# Patient Record
Sex: MALE | Race: WHITE | Employment: OTHER | URBAN - METROPOLITAN AREA
[De-identification: names, ages, dates, MRNs, and addresses within clinical notes are randomized per-mention and may not be internally consistent; named-entity substitution may affect disease eponyms.]

---

## 2017-01-27 ENCOUNTER — OFFICE VISIT (OUTPATIENT)
Dept: NEUROLOGY | Age: 67
End: 2017-01-27

## 2017-01-27 VITALS
RESPIRATION RATE: 18 BRPM | WEIGHT: 221.4 LBS | BODY MASS INDEX: 31.7 KG/M2 | HEIGHT: 70 IN | HEART RATE: 98 BPM | TEMPERATURE: 98.3 F | DIASTOLIC BLOOD PRESSURE: 80 MMHG | SYSTOLIC BLOOD PRESSURE: 142 MMHG | OXYGEN SATURATION: 97 %

## 2017-01-27 DIAGNOSIS — G40.009 LOCALIZATION-RELATED (FOCAL) (PARTIAL) IDIOPATHIC EPILEPSY AND EPILEPTIC SYNDROMES WITH SEIZURES OF LOCALIZED ONSET, NOT INTRACTABLE, WITHOUT STATUS EPILEPTICUS (HCC): Primary | ICD-10-CM

## 2017-01-27 DIAGNOSIS — G35 MULTIPLE SCLEROSIS (HCC): ICD-10-CM

## 2017-01-27 NOTE — PATIENT INSTRUCTIONS
[facility]   Statement to Patients  April 1, 2014      In an effort to ensure the large volume of patient prescription refills is processed in the most efficient and expeditious manner, we are asking our patients to assist us by calling your Pharmacy for all prescription refills, this will include also your  Mail Order Pharmacy. The pharmacy will contact our office electronically to continue the refill process. Please do not wait until the last minute to call your pharmacy. We need at least 48 hours (2days) to fill prescriptions. We also encourage you to call your pharmacy before going to  your prescription to make sure it is ready. With regard to controlled substance prescription refill requests (narcotic refills) that need to be picked up at our office, we ask your cooperation by providing us with at least 72 hours (3days) notice that you will need a refill. We will not refill narcotic prescription refill requests after 4:00pm on any weekday, Monday through Thursday, or after 2:00pm on Fridays, or on the weekends. We encourage everyone to explore another way of getting your prescription refill request processed using Superhuman, our patient web portal through our electronic medical record system. Superhuman is an efficient and effective way to communicate your medication request directly to the office and  downloadable as an davis on your smart phone . Superhuman also features a review functionality that allows you to view your medication list as well as leave messages for your physician. Are you ready to get connected? If so please review the attatched instructions or speak to any of our staff to get you set up right away! Thank you so much for your cooperation. Should you have any questions please contact our Practice Administrator.     The Physicians and Staff,  [facility]  What is a living will?  A living will is a legal form you use to write down the kind of care you want at the end of your life. It is used by the health professionals who will treat you if you aren't able to decide for yourself. If you put your wishes in writing, your loved ones and others will know what kind of care you want. They won't need to guess. This can ease your mind and be helpful to others. A living will is not the same as an estate or property will. An estate will explains what you want to happen with your money and property after you die. Is a living will a legal document? A living will is a legal document. Each state has its own laws about living manuel. If you move to another state, make sure that your living will is legal in the state where you now live. Or you might use a universal form that has been approved by many states. This kind of form can sometimes be completed and stored online. Your electronic copy will then be available wherever you have a connection to the Internet. In most cases, doctors will respect your wishes even if you have a form from a different state. · You don't need an  to complete a living will. But legal advice can be helpful if your state's laws are unclear, your health history is complicated, or your family can't agree on what should be in your living will. · You can change your living will at any time. Some people find that their wishes about end-of-life care change as their health changes. · In addition to making a living will, think about completing a medical power of  form. This form lets you name the person you want to make end-of-life treatment decisions for you (your \"health care agent\") if you're not able to. Many hospitals and nursing homes will give you the forms you need to complete a living will and a medical power of . · Your living will is used only if you can't make or communicate decisions for yourself anymore.  If you become able to make decisions again, you can accept or refuse any treatment, no matter what you wrote in your living will. · Your state may offer an online registry. This is a place where you can store your living will online so the doctors and nurses who need to treat you can find it right away. What should you think about when creating a living will? Talk about your end-of-life wishes with your family members and your doctor. Let them know what you want. That way the people making decisions for you won't be surprised by your choices. Think about these questions as you make your living will:  · Do you know enough about life support methods that might be used? If not, talk to your doctor so you know what might be done if you can't breathe on your own, your heart stops, or you're unable to swallow. · What things would you still want to be able to do after you receive life-support methods? Would you want to be able to walk? To speak? To eat on your own? To live without the help of machines? · If you have a choice, where do you want to be cared for? In your home? At a hospital or nursing home? · Do you want certain Shinto practices performed if you become very ill? · If you have a choice at the end of your life, where would you prefer to die? At home? In a hospital or nursing home? Somewhere else? · Would you prefer to be buried or cremated? · Do you want your organs to be donated after you die? What should you do with your living will? · Make sure that your family members and your health care agent have copies of your living will. · Give your doctor a copy of your living will to keep in your medical record. If you have more than one doctor, make sure that each one has a copy. · You may want to put a copy of your living will where it can be easily found. Where can you learn more? Go to http://liss-wilder.info/. Enter V187 in the search box to learn more about \"Learning About Living Michelle. \"  Current as of: February 24, 2016  Content Version: 11.1  © 1384-8647 [employer]. Care instructions adapted under license by [employer] (which disclaims liability or warranty for this information). If you have questions about a medical condition or this instruction, always ask your healthcare professional. [address] any warranty or liability for your use of this information. Patient doing reasonably well with no seizure recurrence and will maintain Keppra as before. No strong issues or changes with the primary progressive MS and will recommend that he stay as reasonably and safely busy as possible. Revisit 6 months.

## 2017-01-27 NOTE — PROGRESS NOTES
Neurology Consult      Subjective:      Ervin Peterson is a 73 y.o. male who returns with established history of seizures and primary progressive MS. No seizure breakouts and has excellent compliance with his Keppra extended release 500 mg 4 per day. Went over the education process to the elimination of Keppra through the kidneys and how they are linked and what might occur if there is significant kidney dysfunction. Seem to appreciate the information and understood. Primary progressive MS. On no treatment as there is no sanctioned medicine for the same at least not yet. Is very astute and on a sharp learning curve to his limitations and possibilities with left leg function and is very motivated to improve his situation and stay out of trouble. Exam today showed his baseline deficits with left leg function and I did not see a great deal of change. Mentions no new medical or surgical history. Revisit in about 6 months. Current Outpatient Prescriptions   Medication Sig Dispense Refill    LEVETIRACETAM (KEPPRA XR PO) Take 500 mg by mouth.  pravastatin (PRAVACHOL) 20 mg tablet Take 20 mg by mouth nightly.  lisinopril (PRINIVIL, ZESTRIL) 5 mg tablet Take  by mouth daily. No Known Allergies  Past Medical History   Diagnosis Date    Autoimmune disease ([address]      MS    Hypertension     Ill-defined condition      incr. cholesterol    MS (multiple sclerosis) (HCC)      Dx in 2013    Seizures ([facility]      last was 2/14/2012 (none since back on seizure med)      Past Surgical History   Procedure Laterality Date    Hx other surgical       wisdom teeth    Hx tonsillectomy        Social History     Social History    Marital status:      Spouse name: N/A    Number of children: N/A    Years of education: N/A     Occupational History    Not on file.      Social History Main Topics    Smoking status: Never Smoker    Smokeless tobacco: Not on file    Alcohol use No    Drug use: No    Sexual activity: Not on file     Other Topics Concern    Not on file     Social History Narrative      History reviewed. No pertinent family history. Visit Vitals    /80    Pulse 98    Temp 98.3 °F (36.8 °C) (Oral)    Resp 18    Ht 5' 10\" (1.778 m)    Wt 100.4 kg (221 lb 6.4 oz)    SpO2 97%    BMI 31.77 kg/m2        Review of Systems:   A comprehensive review of systems was negative except for that written in the HPI. Neuro Exam:     Appearance: The patient is well developed, well nourished, provides a coherent history and is in no acute distress. Mental Status: Oriented to time, place and person. Mood and affect appropriate. Cranial Nerves:   Intact visual fields. Fundi are benign. BALDO, EOM's full, no nystagmus, no ptosis. Facial sensation is normal. Corneal reflexes are intact. Facial movement is symmetric. Hearing is normal bilaterally. Palate is midline with normal sternocleidomastoid and trapezius muscles are normal. Tongue is midline. Motor:  5/5 strength in upper and lower proximal and distal muscles except left leg where strength is 4+-5-. Normal bulk and tone. No fasciculations. Reflexes:   Deep tendon reflexes 2+/4 and symmetrical except approaching +3 below the waist .   Sensory:   Diminished distally to touch, pinprick and vibration. Gait:   circumduct's and limps on left leg step to step. Tremor:   No tremor noted. Cerebellar:  No cerebellar signs present. Neurovascular:  Normal heart sounds and regular rhythm, peripheral pulses intact, and no carotid bruits. 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 Continue Keppra extended release 500 mg 4 per day. Went over the education process to the effect of kidney disease on the elimination of Keppra etc.    Problem 2 primary progressive MS.   Doing reasonably well and is on a very strong learning curve with his limitations and possibilities. Stay as reasonably and safely busy as possible. No big changes seen on exam today. Plan:   Revisit 6 months.   Signed by :  Librado Francisco MD

## 2017-01-27 NOTE — MR AVS SNAPSHOT
Visit Information Date & Time Provider Department Dept. Phone Encounter #  
 1/27/2017 10:40 AM Doc MD [facility] [phone] [id number] Follow-up Instructions Return in about 6 months (around 7/27/2017). Upcoming Health Maintenance Date Due Hepatitis C Screening 1950 DTaP/Tdap/Td series (1 - Tdap) 8/28/1972 FOBT Q 1 YEAR AGE 50-75 8/28/2001 ZOSTER VACCINE AGE 60> 8/28/2011 INFLUENZA AGE 9 TO ADULT 8/1/2016 GLAUCOMA SCREENING Q2Y 8/28/2016 Pneumococcal 65+ Low/Medium Risk (1 of 2 - PCV13) 8/28/2016 MEDICARE YEARLY EXAM 8/28/2016 Allergies as of 1/27/2017  Review Complete On: 1/27/2017 By: Sebastián Ramirez LPN No Known Allergies Current Immunizations  Never Reviewed No immunizations on file. Not reviewed this visit You Were Diagnosed With   
  
 Codes Comments Localization-related (focal) (partial) idiopathic epilepsy and epileptic syndromes with seizures of localized onset, not intractable, without status epilepticus ([facility]    -  Primary ICD-10-CM: G40.009 ICD-9-CM: 345.50 Multiple sclerosis ([facility]     ICD-10-CM: G35 
ICD-9-CM: 705 Vitals BP Pulse Temp Resp Height(growth percentile) Weight(growth percentile) 142/80 98 98.3 °F (36.8 °C) (Oral) 18 5' 10\" (1.778 m) 221 lb 6.4 oz (100.4 kg) SpO2 BMI Smoking Status 97% 31.77 kg/m2 Never Smoker Vitals History BMI and BSA Data Body Mass Index Body Surface Area 31.77 kg/m 2 2.23 m 2 Preferred Pharmacy Pharmacy Name Phone [address] [facility] [phone] Your Updated Medication List  
  
   
This list is accurate as of: 1/27/17 11:25 AM.  Always use your most recent med list.  
  
  
  
  
 KEPPRA XR PO Take 500 mg by mouth.  
  
 lisinopril 5 mg tablet Commonly known as:  Antoine Ney Take  by mouth daily. pravastatin 20 mg tablet Commonly known as:  PRAVACHOL Take 20 mg by mouth nightly. Follow-up Instructions Return in about 6 months (around 7/27/2017). Patient Instructions PRESCRIPTION REFILL POLICY [facility] Statement to Patients April 1, 2014 In an effort to ensure the large volume of patient prescription refills is processed in the most efficient and expeditious manner, we are asking our patients to assist us by calling your Pharmacy for all prescription refills, this will include also your  Mail Order Pharmacy. The pharmacy will contact our office electronically to continue the refill process. Please do not wait until the last minute to call your pharmacy. We need at least 48 hours (2days) to fill prescriptions. We also encourage you to call your pharmacy before going to  your prescription to make sure it is ready. With regard to controlled substance prescription refill requests (narcotic refills) that need to be picked up at our office, we ask your cooperation by providing us with at least 72 hours (3days) notice that you will need a refill. We will not refill narcotic prescription refill requests after 4:00pm on any weekday, Monday through Thursday, or after 2:00pm on Fridays, or on the weekends. We encourage everyone to explore another way of getting your prescription refill request processed using ioSafe, our patient web portal through our electronic medical record system. ioSafe is an efficient and effective way to communicate your medication request directly to the office and  downloadable as an davis on your smart phone . ioSafe also features a review functionality that allows you to view your medication list as well as leave messages for your physician. Are you ready to get connected? If so please review the attatched instructions or speak to any of our staff to get you set up right away! Thank you so much for your cooperation. Should you have any questions please contact our Practice Administrator. The Physicians and Staff,  [facility] [address] What is a living will? A living will is a legal form you use to write down the kind of care you want at the end of your life. It is used by the health professionals who will treat you if you aren't able to decide for yourself. If you put your wishes in writing, your loved ones and others will know what kind of care you want. They won't need to guess. This can ease your mind and be helpful to others. A living will is not the same as an estate or property will. An estate will explains what you want to happen with your money and property after you die. Is a living will a legal document? A living will is a legal document. Each state has its own laws about living manuel. If you move to another state, make sure that your living will is legal in the state where you now live. Or you might use a universal form that has been approved by many states. This kind of form can sometimes be completed and stored online. Your electronic copy will then be available wherever you have a connection to the Internet. In most cases, doctors will respect your wishes even if you have a form from a different state. · You don't need an  to complete a living will. But legal advice can be helpful if your state's laws are unclear, your health history is complicated, or your family can't agree on what should be in your living will. · You can change your living will at any time. Some people find that their wishes about end-of-life care change as their health changes. · In addition to making a living will, think about completing a medical power of  form.  This form lets you name the person you want to make end-of-life treatment decisions for you (your \"health care agent\") if you're not able to. Many hospitals and nursing homes will give you the forms you need to complete a living will and a medical power of . · Your living will is used only if you can't make or communicate decisions for yourself anymore. If you become able to make decisions again, you can accept or refuse any treatment, no matter what you wrote in your living will. · Your state may offer an online registry. This is a place where you can store your living will online so the doctors and nurses who need to treat you can find it right away. What should you think about when creating a living will? Talk about your end-of-life wishes with your family members and your doctor. Let them know what you want. That way the people making decisions for you won't be surprised by your choices. Think about these questions as you make your living will: · Do you know enough about life support methods that might be used? If not, talk to your doctor so you know what might be done if you can't breathe on your own, your heart stops, or you're unable to swallow. · What things would you still want to be able to do after you receive life-support methods? Would you want to be able to walk? To speak? To eat on your own? To live without the help of machines? · If you have a choice, where do you want to be cared for? In your home? At a hospital or nursing home? · Do you want certain Gnosticist practices performed if you become very ill? · If you have a choice at the end of your life, where would you prefer to die? At home? In a hospital or nursing home? Somewhere else? · Would you prefer to be buried or cremated? · Do you want your organs to be donated after you die? What should you do with your living will? · Make sure that your family members and your health care agent have copies of your living will.  
· Give your doctor a copy of your living will to keep in your medical record. If you have more than one doctor, make sure that each one has a copy. · You may want to put a copy of your living will where it can be easily found. Where can you learn more? Go to http://liss-wilder.info/. Enter S100 in the search box to learn more about \"Learning About Living Giovanni Nava. \" Current as of: February 24, 2016 Content Version: 11.1 © 3699-3181 [employer]. Care instructions adapted under license by [employer] (which disclaims liability or warranty for this information). If you have questions about a medical condition or this instruction, always ask your healthcare professional. [address] any warranty or liability for your use of this information. Patient doing reasonably well with no seizure recurrence and will maintain Keppra as before. No strong issues or changes with the primary progressive MS and will recommend that he stay as reasonably and safely busy as possible. Revisit 6 months. Introducing [facility]! Bianca Ness introduces [employer] patient portal. Now you can access parts of your medical record, email your doctor's office, and request medication refills online. 1. In your internet browser, go to [email or web address] 2. Click on the First Time User? Click Here link in the Sign In box. You will see the New Member Sign Up page. 3. Enter your [employer] Access Code exactly as it appears below. You will not need to use this code after youve completed the sign-up process. If you do not sign up before the expiration date, you must request a new code. · [employer] Access Code: [email or web address] Expires: 4/27/2017 10:23 AM 
 
4. Enter the last four digits of your Social Security Number (xxxx) and Date of Birth (mm/dd/yyyy) as indicated and click Submit. You will be taken to the next sign-up page. 5. Create a [employer] ID.  This will be your [employer] login ID and cannot be changed, so think of one that is secure and easy to remember. 6. Create a SnapShop password. You can change your password at any time. 7. Enter your Password Reset Question and Answer. This can be used at a later time if you forget your password. 8. Enter your e-mail address. You will receive e-mail notification when new information is available in [address] 9. Click Sign Up. You can now view and download portions of your medical record. 10. Click the Download Summary menu link to download a portable copy of your medical information. If you have questions, please visit the Frequently Asked Questions section of the SnapShop website. Remember, SnapShop is NOT to be used for urgent needs. For medical emergencies, dial 911. Now available from your iPhone and Android! Please provide this summary of care documentation to your next provider. Your primary care clinician is listed as Mercedes Chaudhry. If you have any questions after today's visit, please call [phone].

## 2017-07-28 ENCOUNTER — OFFICE VISIT (OUTPATIENT)
Dept: NEUROLOGY | Age: 67
End: 2017-07-28

## 2017-07-28 VITALS
BODY MASS INDEX: 31.65 KG/M2 | OXYGEN SATURATION: 96 % | HEART RATE: 99 BPM | DIASTOLIC BLOOD PRESSURE: 82 MMHG | WEIGHT: 221.1 LBS | RESPIRATION RATE: 18 BRPM | TEMPERATURE: 98.9 F | SYSTOLIC BLOOD PRESSURE: 124 MMHG | HEIGHT: 70 IN

## 2017-07-28 DIAGNOSIS — G35 MULTIPLE SCLEROSIS (HCC): Primary | ICD-10-CM

## 2017-07-28 DIAGNOSIS — G40.209 LOCALIZATION-RELATED PARTIAL EPILEPSY WITH COMPLEX PARTIAL SEIZURES (HCC): ICD-10-CM

## 2017-07-28 NOTE — MR AVS SNAPSHOT
Visit Information Date & Time Provider Department Dept. Phone Encounter #  
 7/28/2017 11:40 AM [facility] [phone] [id number] Follow-up Instructions Return in about 6 months (around 1/28/2018). Your Appointments 2/2/2018 11:40 AM  
Follow Up with Eva Abraham MD  
[facility]) Appt Note: follow  up seizures/MS  $0CP  [place]  7/28/17  
 [place] [address] [facility] [address] [id number] [phone]  
  
   
 [place] [id number] [address] Upcoming Health Maintenance Date Due Hepatitis C Screening 1950 DTaP/Tdap/Td series (1 - Tdap) 8/28/1972 FOBT Q 1 YEAR AGE 50-75 8/28/2001 ZOSTER VACCINE AGE 60> 6/28/2011 GLAUCOMA SCREENING Q2Y 8/28/2016 Pneumococcal 65+ Low/Medium Risk (1 of 2 - PCV13) 8/28/2016 MEDICARE YEARLY EXAM 8/28/2016 INFLUENZA AGE 9 TO ADULT 8/1/2017 Allergies as of 7/28/2017  Review Complete On: 7/28/2017 By: Eva Abraham MD  
 No Known Allergies Current Immunizations  Never Reviewed No immunizations on file. Not reviewed this visit You Were Diagnosed With   
  
 Codes Comments Multiple sclerosis ([facility] [address]    -  Primary ICD-10-CM: G35 
ICD-9-CM: 444 Localization-related partial epilepsy with complex partial seizures ([facility] [address]     ICD-10-CM: J85.873 ICD-9-CM: 345.40 Vitals BP Pulse Temp Resp Height(growth percentile) Weight(growth percentile) 124/82 99 98.9 °F (37.2 °C) (Oral) 18 5' 10\" (1.778 m) 221 lb 1.6 oz (100.3 kg) SpO2 BMI Smoking Status 96% 31.72 kg/m2 Never Smoker Vitals History BMI and BSA Data Body Mass Index Body Surface Area 31.72 kg/m 2 2.23 m 2 Preferred Pharmacy Pharmacy Name Phone [address] [facility] [phone] Your Updated Medication List  
  
   
This list is accurate as of: 7/28/17 12:20 PM.  Always use your most recent med list.  
  
  
  
  
 KEPPRA XR PO Take 500 mg by mouth.  
  
 lisinopril 5 mg tablet Commonly known as:  Grainger Emeli Take  by mouth daily. pravastatin 20 mg tablet Commonly known as:  PRAVACHOL Take 20 mg by mouth nightly. Follow-up Instructions Return in about 6 months (around 1/28/2018). Patient Instructions PRESCRIPTION REFILL POLICY [facility] Statement to Patients April 1, 2014 In an effort to ensure the large volume of patient prescription refills is processed in the most efficient and expeditious manner, we are asking our patients to assist us by calling your Pharmacy for all prescription refills, this will include also your  Mail Order Pharmacy. The pharmacy will contact our office electronically to continue the refill process. Please do not wait until the last minute to call your pharmacy. We need at least 48 hours (2days) to fill prescriptions. We also encourage you to call your pharmacy before going to  your prescription to make sure it is ready. With regard to controlled substance prescription refill requests (narcotic refills) that need to be picked up at our office, we ask your cooperation by providing us with at least 72 hours (3days) notice that you will need a refill. We will not refill narcotic prescription refill requests after 4:00pm on any weekday, Monday through Thursday, or after 2:00pm on Fridays, or on the weekends. We encourage everyone to explore another way of getting your prescription refill request processed using [employer], our patient web portal through our electronic medical record system. [employer] is an efficient and effective way to communicate your medication request directly to the office and  downloadable as an davis on your smart phone .  [employer] also features a review functionality that allows you to view your medication list as well as leave messages for your physician. Are you ready to get connected? If so please review the attatched instructions or speak to any of our staff to get you set up right away! Thank you so much for your cooperation. Should you have any questions please contact our Practice Administrator. The Physicians and Staff,  [facility] Multiple Sclerosis (MS): Care Instructions Your Care Instructions Multiple sclerosis, also called MS, is a disease that can affect the brain, spinal cord, and nerves to the eyes. MS can cause problems with muscle control and strength, vision, balance, feeling, and thinking. Whatever your symptoms are, taking medicine correctly and following your doctor's advice for home care can help you maintain your quality of life. Follow-up care is a key part of your treatment and safety. Be sure to make and go to all appointments, and call your doctor if you are having problems. It's also a good idea to know your test results and keep a list of the medicines you take. How can you care for yourself at home? General care · Take your medicines exactly as prescribed. Call your doctor if you think you are having a problem with your medicine. · Use a cane, walker, or scooter if your doctor suggests it. · Keep doing your normal activities as much as you can. · If you have problems urinating, press or tap your bladder area to help start urine flow. If you have trouble controlling your urine, plan your fluid intake and activities so that a toilet will be available when you need it. · Spend time with family and friends. Join a support group for people with MS if you want extra help. · Depression is common with this condition. Tell your doctor if you have trouble sleeping, are eating too much or are not hungry, or feel sad or tearful all the time.  Depression can be treated with medicine and counseling. Diet and exercise · Eat a balanced diet. · If you have problems swallowing, change how and what you eat: ¨ Try thick drinks, such as milk shakes. They are easier to swallow than other fluids. ¨ Do not eat foods that crumble easily. These can cause choking. ¨ Use a  to prepare food. Soft foods need less chewing. ¨ Eat small meals often so that you do not get tired from eating larger meals. · Get exercise on most days. Work with your doctor to set up a program of walking, swimming, or other exercise that you are able to do. A physical therapist can teach you exercises if you cannot walk but can move your limbs and trunk. Or you can do exercises to help with coordination and balance. You can help improve muscle stiffness by doing exercises while lying in certain positions. When should you call for help? Call your doctor now or seek immediate medical care if: 
· You have a change in symptoms. · You fall or have another injury. · You have symptoms of a urinary infection. For example: ¨ You have blood or pus in your urine. ¨ You have pain in your back just below your rib cage. This is called flank pain. ¨ You have a fever, chills, or body aches. ¨ It hurts to urinate. ¨ You have groin or belly pain. Watch closely for changes in your health, and be sure to contact your doctor if: 
· You want more information about MS or medicines. · You have questions about alternative treatments. Do not use any other treatments without talking to your doctor first. 
Where can you learn more? Go to http://liss-wilder.info/. Enter I907 in the search box to learn more about \"Multiple Sclerosis (MS): Care Instructions. \" Current as of: November 28, 2016 Content Version: 11.3 © 2967-5302 Personal Estate Manager.  Care instructions adapted under license by [employer] (which disclaims liability or warranty for this information). If you have questions about a medical condition or this instruction, always ask your healthcare professional. [place] [address] any warranty or liability for your use of this information. Randy Londono 1721 What is a living will? A living will is a legal form you use to write down the kind of care you want at the end of your life. It is used by the health professionals who will treat you if you aren't able to decide for yourself. If you put your wishes in writing, your loved ones and others will know what kind of care you want. They won't need to guess. This can ease your mind and be helpful to others. A living will is not the same as an estate or property will. An estate will explains what you want to happen with your money and property after you die. Is a living will a legal document? A living will is a legal document. Each state has its own laws about living manuel. If you move to another state, make sure that your living will is legal in the state where you now live. Or you might use a universal form that has been approved by many states. This kind of form can sometimes be completed and stored online. Your electronic copy will then be available wherever you have a connection to the Internet. In most cases, doctors will respect your wishes even if you have a form from a different state. · You don't need an  to complete a living will. But legal advice can be helpful if your state's laws are unclear, your health history is complicated, or your family can't agree on what should be in your living will. · You can change your living will at any time. Some people find that their wishes about end-of-life care change as their health changes. · In addition to making a living will, think about completing a medical power of  form.  This form lets you name the person you want to make end-of-life treatment decisions for you (your \"health care agent\") if you're not able to. Many hospitals and nursing homes will give you the forms you need to complete a living will and a medical power of . · Your living will is used only if you can't make or communicate decisions for yourself anymore. If you become able to make decisions again, you can accept or refuse any treatment, no matter what you wrote in your living will. · Your state may offer an online registry. This is a place where you can store your living will online so the doctors and nurses who need to treat you can find it right away. What should you think about when creating a living will? Talk about your end-of-life wishes with your family members and your doctor. Let them know what you want. That way the people making decisions for you won't be surprised by your choices. Think about these questions as you make your living will: · Do you know enough about life support methods that might be used? If not, talk to your doctor so you know what might be done if you can't breathe on your own, your heart stops, or you're unable to swallow. · What things would you still want to be able to do after you receive life-support methods? Would you want to be able to walk? To speak? To eat on your own? To live without the help of machines? · If you have a choice, where do you want to be cared for? In your home? At a hospital or nursing home? · Do you want certain Mormonism practices performed if you become very ill? · If you have a choice at the end of your life, where would you prefer to die? At home? In a hospital or nursing home? Somewhere else? · Would you prefer to be buried or cremated? · Do you want your organs to be donated after you die? What should you do with your living will? · Make sure that your family members and your health care agent have copies of your living will. · Give your doctor a copy of your living will to keep in your medical record. If you have more than one doctor, make sure that each one has a copy. · You may want to put a copy of your living will where it can be easily found. Where can you learn more? Go to http://liss-wilder.info/. Enter X809 in the search box to learn more about \"Learning About Living [employer]. \" Current as of: August 8, 2016 Content Version: 11.3 © 9966-0210 [employer]. Care instructions adapted under license by [employer] (which disclaims liability or warranty for this information). If you have questions about a medical condition or this instruction, always ask your healthcare professional. [address] any warranty or liability for your use of this information. Patient appears baseline and will recommend simple continuation of Keppra and the primary progressive MS looks status quo as well. Revisit 6 months. Introducing [facility]! Tunde Molina introduces [employer] patient portal. Now you can access parts of your medical record, email your doctor's office, and request medication refills online. 1. In your internet browser, go to [email or web address] 2. Click on the First Time User? Click Here link in the Sign In box. You will see the New Member Sign Up page. 3. Enter your [employer] Access Code exactly as it appears below. You will not need to use this code after youve completed the sign-up process. If you do not sign up before the expiration date, you must request a new code. · [employer] Access Code: [email or web address] Expires: 10/26/2017 11:34 AM 
 
4. Enter the last four digits of your Social Security Number (xxxx) and Date of Birth (mm/dd/yyyy) as indicated and click Submit. You will be taken to the next sign-up page. 5. Create a [employer] Local ID.  This will be your [employer] login ID and cannot be changed, so think of one that is secure and easy to remember. 6. Create a DiscountDoc password. You can change your password at any time. 7. Enter your Password Reset Question and Answer. This can be used at a later time if you forget your password. 8. Enter your e-mail address. You will receive e-mail notification when new information is available in [address] 9. Click Sign Up. You can now view and download portions of your medical record. 10. Click the Download Summary menu link to download a portable copy of your medical information. If you have questions, please visit the Frequently Asked Questions section of the Disc[employer] website. Remember, [employer]Doc is NOT to be used for urgent needs. For medical emergencies, dial 911. Now available from your iPhone and Android! Please provide this summary of care documentation to your next provider. Your primary care clinician is listed as Amy Solis. If you have any questions after today's visit, please call [phone].

## 2017-07-28 NOTE — PATIENT INSTRUCTIONS
10 [facility]   Statement to Patients  April 1, 2014      In an effort to ensure the large volume of patient prescription refills is processed in the most efficient and expeditious manner, we are asking our patients to assist us by calling your Pharmacy for all prescription refills, this will include also your  Mail Order Pharmacy. The pharmacy will contact our office electronically to continue the refill process. Please do not wait until the last minute to call your pharmacy. We need at least 48 hours (2days) to fill prescriptions. We also encourage you to call your pharmacy before going to  your prescription to make sure it is ready. With regard to controlled substance prescription refill requests (narcotic refills) that need to be picked up at our office, we ask your cooperation by providing us with at least 72 hours (3days) notice that you will need a refill. We will not refill narcotic prescription refill requests after 4:00pm on any weekday, Monday through Thursday, or after 2:00pm on Fridays, or on the weekends. We encourage everyone to explore another way of getting your prescription refill request processed using [employer], our patient web portal through our electronic medical record system. [employer] is an efficient and effective way to communicate your medication request directly to the office and  downloadable as an davis on your smart phone . [employer] also features a review functionality that allows you to view your medication list as well as leave messages for your physician. Are you ready to get connected? If so please review the attatched instructions or speak to any of our staff to get you set up right away! Thank you so much for your cooperation. Should you have any questions please contact our Practice Administrator.     The Physicians and Staff,  [facility]                Multiple Sclerosis (MS): Care Instructions  Your Care Instructions  Multiple sclerosis, also called MS, is a disease that can affect the brain, spinal cord, and nerves to the eyes. MS can cause problems with muscle control and strength, vision, balance, feeling, and thinking. Whatever your symptoms are, taking medicine correctly and following your doctor's advice for home care can help you maintain your quality of life. Follow-up care is a key part of your treatment and safety. Be sure to make and go to all appointments, and call your doctor if you are having problems. It's also a good idea to know your test results and keep a list of the medicines you take. How can you care for yourself at home? General care  · Take your medicines exactly as prescribed. Call your doctor if you think you are having a problem with your medicine. · Use a cane, walker, or scooter if your doctor suggests it. · Keep doing your normal activities as much as you can. · If you have problems urinating, press or tap your bladder area to help start urine flow. If you have trouble controlling your urine, plan your fluid intake and activities so that a toilet will be available when you need it. · Spend time with family and friends. Join a support group for people with MS if you want extra help. · Depression is common with this condition. Tell your doctor if you have trouble sleeping, are eating too much or are not hungry, or feel sad or tearful all the time. Depression can be treated with medicine and counseling. Diet and exercise  · Eat a balanced diet. · If you have problems swallowing, change how and what you eat:  ¨ Try thick drinks, such as milk shakes. They are easier to swallow than other fluids. ¨ Do not eat foods that crumble easily. These can cause choking. ¨ Use a  to prepare food. Soft foods need less chewing. ¨ Eat small meals often so that you do not get tired from eating larger meals. · Get exercise on most days.  Work with your doctor to set up a program of walking, swimming, or other exercise that you are able to do. A physical therapist can teach you exercises if you cannot walk but can move your limbs and trunk. Or you can do exercises to help with coordination and balance. You can help improve muscle stiffness by doing exercises while lying in certain positions. When should you call for help? Call your doctor now or seek immediate medical care if:  · You have a change in symptoms. · You fall or have another injury. · You have symptoms of a urinary infection. For example:  ¨ You have blood or pus in your urine. ¨ You have pain in your back just below your rib cage. This is called flank pain. ¨ You have a fever, chills, or body aches. ¨ It hurts to urinate. ¨ You have groin or belly pain. Watch closely for changes in your health, and be sure to contact your doctor if:  · You want more information about MS or medicines. · You have questions about alternative treatments. Do not use any other treatments without talking to your doctor first.  Where can you learn more? Go to http://liss-wilder.info/. Enter G502 in the search box to learn more about \"Multiple Sclerosis (MS): Care Instructions. \"  Current as of: November 28, 2016  Content Version: 11.3  © 7298-8243 [employer]. Care instructions adapted under license by [employer] (which disclaims liability or warranty for this information). If you have questions about a medical condition or this instruction, always ask your healthcare professional. [place] [address] any warranty or liability for your use of this information. Learning About Living Perroy  What is a living will? A living will is a legal form you use to write down the kind of care you want at the end of your life. It is used by the health professionals who will treat you if you aren't able to decide for yourself.   If you put your wishes in writing, your loved ones and others will know what kind of care you want. They won't need to guess. This can ease your mind and be helpful to others. A living will is not the same as an estate or property will. An estate will explains what you want to happen with your money and property after you die. Is a living will a legal document? A living will is a legal document. Each state has its own laws about living manuel. If you move to another state, make sure that your living will is legal in the state where you now live. Or you might use a universal form that has been approved by many states. This kind of form can sometimes be completed and stored online. Your electronic copy will then be available wherever you have a connection to the Internet. In most cases, doctors will respect your wishes even if you have a form from a different state. · You don't need an  to complete a living will. But legal advice can be helpful if your state's laws are unclear, your health history is complicated, or your family can't agree on what should be in your living will. · You can change your living will at any time. Some people find that their wishes about end-of-life care change as their health changes. · In addition to making a living will, think about completing a medical power of  form. This form lets you name the person you want to make end-of-life treatment decisions for you (your \"health care agent\") if you're not able to. Many hospitals and nursing homes will give you the forms you need to complete a living will and a medical power of . · Your living will is used only if you can't make or communicate decisions for yourself anymore. If you become able to make decisions again, you can accept or refuse any treatment, no matter what you wrote in your living will. · Your state may offer an online registry.  This is a place where you can store your living will online so the doctors and nurses who need to treat you can find it right away. What should you think about when creating a living will? Talk about your end-of-life wishes with your family members and your doctor. Let them know what you want. That way the people making decisions for you won't be surprised by your choices. Think about these questions as you make your living will:  · Do you know enough about life support methods that might be used? If not, talk to your doctor so you know what might be done if you can't breathe on your own, your heart stops, or you're unable to swallow. · What things would you still want to be able to do after you receive life-support methods? Would you want to be able to walk? To speak? To eat on your own? To live without the help of machines? · If you have a choice, where do you want to be cared for? In your home? At a hospital or nursing home? · Do you want certain Zoroastrianism practices performed if you become very ill? · If you have a choice at the end of your life, where would you prefer to die? At home? In a hospital or nursing home? Somewhere else? · Would you prefer to be buried or cremated? · Do you want your organs to be donated after you die? What should you do with your living will? · Make sure that your family members and your health care agent have copies of your living will. · Give your doctor a copy of your living will to keep in your medical record. If you have more than one doctor, make sure that each one has a copy. · You may want to put a copy of your living will where it can be easily found. Where can you learn more? Go to http://liss-wilder.info/. Enter W324 in the search box to learn more about \"Learning About Living Michelle. \"  Current as of: August 8, 2016  Content Version: 11.3  © 4094-4773 [employer]. Care instructions adapted under license by [employer] (which disclaims liability or warranty for this information).  If you have questions about a medical condition or this instruction, always ask your healthcare professional. [place] [address] any warranty or liability for your use of this information. Patient appears baseline and will recommend simple continuation of Keppra and the primary progressive MS looks status quo as well. Revisit 6 months.

## 2017-09-18 RX ORDER — LEVETIRACETAM 500 MG/1
TABLET, EXTENDED RELEASE ORAL
Qty: 360 TAB | Refills: 3 | Status: SHIPPED | OUTPATIENT
Start: 2017-09-18 | End: 2018-11-15 | Stop reason: SDUPTHER

## 2018-02-02 ENCOUNTER — OFFICE VISIT (OUTPATIENT)
Dept: NEUROLOGY | Age: 68
End: 2018-02-02

## 2018-02-02 VITALS
OXYGEN SATURATION: 97 % | BODY MASS INDEX: 31.7 KG/M2 | WEIGHT: 221.4 LBS | HEIGHT: 70 IN | TEMPERATURE: 98.7 F | HEART RATE: 77 BPM | RESPIRATION RATE: 18 BRPM

## 2018-02-02 DIAGNOSIS — G35 MS (MULTIPLE SCLEROSIS) (HCC): Primary | ICD-10-CM

## 2018-02-02 DIAGNOSIS — G40.209 LOCALIZATION-RELATED SYMPTOMATIC EPILEPSY AND EPILEPTIC SYNDROMES WITH COMPLEX PARTIAL SEIZURES, NOT INTRACTABLE, WITHOUT STATUS EPILEPTICUS (HCC): ICD-10-CM

## 2018-02-02 NOTE — MR AVS SNAPSHOT
[address] [place], [place] [address] [id number] [phone] Patient: Mariana Jones MRN: [id number] Visit Information Date & Time Provider Department Dept. Phone Encounter #  
 2/2/2018 11:40 AM Albaro Brush MD [facility] [phone] [id number] Follow-up Instructions Return in about 6 months (around 8/2/2018). Your Appointments 8/3/2018 11:40 AM  
Follow Up with Albaro Brush MD  
[facility]) Appt Note: follow up seizures/MS  $0CP  [place]  [facility]18 [facility] [address] [facility] [id number] [phone]  
  
   
 [facility] [address] [id number] [address] Upcoming Health Maintenance Date Due Hepatitis C Screening 1950 DTaP/Tdap/Td series (1 - Tdap) 8/28/1972 FOBT Q 1 YEAR AGE 50-75 8/28/2001 ZOSTER VACCINE AGE 60> 6/28/2011 GLAUCOMA SCREENING Q2Y 8/28/2016 Pneumococcal 65+ Low/Medium Risk (1 of 2 - PCV13) 8/28/2016 MEDICARE YEARLY EXAM 8/28/2016 Influenza Age 5 to Adult 8/1/2017 Allergies as of 2/2/2018  Review Complete On: 2/2/2018 By: Albaro Brush MD  
 No Known Allergies Current Immunizations  Never Reviewed No immunizations on file. Not reviewed this visit You Were Diagnosed With   
  
 Codes Comments MS (multiple sclerosis) ([facility] [address]    -  Primary ICD-10-CM: G35 
ICD-9-CM: 004 Localization-related symptomatic epilepsy and epileptic syndromes with complex partial seizures, not intractable, without status epilepticus ([facility] [address]     ICD-10-CM: Z17.547 ICD-9-CM: 345.40 Vitals Pulse Temp Resp Height(growth percentile) Weight(growth percentile) SpO2  
 77 98.7 °F (37.1 °C) (Oral) 18 5' 10\" (1.778 m) 221 lb 6.4 oz (100.4 kg) 97% BMI Smoking Status 31.77 kg/m2 Never Smoker Vitals History BMI and BSA Data Body Mass Index Body Surface Area 31.77 kg/m 2 2.23 m 2 Preferred Pharmacy Pharmacy Name Phone Greg Campos [address] [place], [place] [id number] [address] [phone] Your Updated Medication List  
  
   
This list is accurate as of: 2/2/18 12:23 PM.  Always use your most recent med list.  
  
  
  
  
 levETIRAcetam 500 mg ER tablet Commonly known as:  KEPPRA XR  
TAKE 4 TABLETS EVERY MORNING  
  
 lisinopril 5 mg tablet Commonly known as:  Cleotis Limon Take  by mouth daily. pravastatin 20 mg tablet Commonly known as:  PRAVACHOL Take 20 mg by mouth nightly. Follow-up Instructions Return in about 6 months (around 8/2/2018). Patient Instructions PRESCRIPTION REFILL POLICY [facility] Statement to Patients April 1, 2014 In an effort to ensure the large volume of patient prescription refills is processed in the most efficient and expeditious manner, we are asking our patients to assist us by calling your Pharmacy for all prescription refills, this will include also your  Mail Order Pharmacy. The pharmacy will contact our office electronically to continue the refill process. Please do not wait until the last minute to call your pharmacy. We need at least 48 hours (2days) to fill prescriptions. We also encourage you to call your pharmacy before going to  your prescription to make sure it is ready. With regard to controlled substance prescription refill requests (narcotic refills) that need to be picked up at our office, we ask your cooperation by providing us with at least 72 hours (3days) notice that you will need a refill. We will not refill narcotic prescription refill requests after 4:00pm on any weekday, Monday through Thursday, or after 2:00pm on Fridays, or on the weekends. We encourage everyone to explore another way of getting your prescription refill request processed using [employer], our patient web portal through our electronic medical record system. [employer] is an efficient and effective way to communicate your medication request directly to the office and  downloadable as an davis on your smart phone . [employer] also features a review functionality that allows you to view your medication list as well as leave messages for your physician. Are you ready to get connected? If so please review the attatched instructions or speak to any of our staff to get you set up right away! Thank you so much for your cooperation. Should you have any questions please contact our Practice Administrator. The Physicians and Staff,  [facility] [address] What is a living will? A living will is a legal form you use to write down the kind of care you want at the end of your life. It is used by the health professionals who will treat you if you aren't able to decide for yourself. If you put your wishes in writing, your loved ones and others will know what kind of care you want. They won't need to guess. This can ease your mind and be helpful to others. A living will is not the same as an estate or property will. An estate will explains what you want to happen with your money and property after you die. Is a living will a legal document? A living will is a legal document. Each state has its own laws about living manuel. If you move to another state, make sure that your living will is legal in the state where you now live. Or you might use a universal form that has been approved by many states. This kind of form can sometimes be completed and stored online. Your electronic copy will then be available wherever you have a connection to the Internet. In most cases, doctors will respect your wishes even if you have a form from a different state. · You don't need an  to complete a living will. But legal advice can be helpful if your state's laws are unclear, your health history is complicated, or your family can't agree on what should be in your living will. · You can change your living will at any time. Some people find that their wishes about end-of-life care change as their health changes. · In addition to making a living will, think about completing a medical power of  form. This form lets you name the person you want to make end-of-life treatment decisions for you (your \"health care agent\") if you're not able to. Many hospitals and nursing homes will give you the forms you need to complete a living will and a medical power of . · Your living will is used only if you can't make or communicate decisions for yourself anymore. If you become able to make decisions again, you can accept or refuse any treatment, no matter what you wrote in your living will. · Your state may offer an online registry. This is a place where you can store your living will online so the doctors and nurses who need to treat you can find it right away. What should you think about when creating a living will? Talk about your end-of-life wishes with your family members and your doctor. Let them know what you want. That way the people making decisions for you won't be surprised by your choices. Think about these questions as you make your living will: · Do you know enough about life support methods that might be used? If not, talk to your doctor so you know what might be done if you can't breathe on your own, your heart stops, or you're unable to swallow. · What things would you still want to be able to do after you receive life-support methods? Would you want to be able to walk? To speak? To eat on your own? To live without the help of machines? · If you have a choice, where do you want to be cared for? In your home? At a hospital or nursing home? · Do you want certain Cheondoism practices performed if you become very ill? · If you have a choice at the end of your life, where would you prefer to die? At home? In a hospital or nursing home? Somewhere else? · Would you prefer to be buried or cremated? · Do you want your organs to be donated after you die? What should you do with your living will? · Make sure that your family members and your health care agent have copies of your living will. · Give your doctor a copy of your living will to keep in your medical record. If you have more than one doctor, make sure that each one has a copy. · You may want to put a copy of your living will where it can be easily found. Where can you learn more? Go to http://liss-wilder.info/. Enter V194 in the search box to learn more about \"Learning About Living Jerod Dumont. \" Current as of: September 24, 2016 Content Version: 11.4 © 2270-8017 [employer]. Care instructions adapted under license by [employer] (which disclaims liability or warranty for this information). If you have questions about a medical condition or this instruction, always ask your healthcare professional. [address] any warranty or liability for your use of this information. Patient history reviewed and patient examined. Is doing remarkably status quo and I hope that continues. Continue on the Keppra extended release and currently is handling his MS by using good [employer] and staying reasonably busy. Introducing [facility] [place] [facility]! [employer] introduces [employer] patient portal. Now you can access parts of your medical record, email your doctor's office, and request medication refills online. 1. In your internet browser, go to [email or web address] 2. Click on the First Time User? Click Here link in the Sign In box. You will see the New Member Sign Up page. 3. Enter your Th[employer] Access Code exactly as it appears below. You will not need to use this code after youve completed the sign-up process. If you do not sign up before the expiration date, you must request a new code. · Th[employer]acia Access Code: [email or web address] Expires: 5/3/2018 12:23 PM 
 
4. Enter the last four digits of your Social Security Number (xxxx) and Date of Birth (mm/dd/yyyy) as indicated and click Submit. You will be taken to the next sign-up page. 5. Create a Thrinacia ID. This will be your Thrinacia login ID and cannot be changed, so think of one that is secure and easy to remember. 6. Create a Thrinacia password. You can change your password at any time. 7. Enter your Password Reset Question and Answer. This can be used at a later time if you forget your password. 8. Enter your e-mail address. You will receive e-mail notification when new information is available in [address] 9. Click Sign Up. You can now view and download portions of your medical record. 10. Click the Download Summary menu link to download a portable copy of your medical information. If you have questions, please visit the Frequently Asked Questions section of the Thrinacia website. Remember, Thrinacia is NOT to be used for urgent needs. For medical emergencies, dial 911. Now available from your iPhone and Android! Please provide this summary of care documentation to your next provider. Your primary care clinician is listed as Melody Lewis. If you have any questions after today's visit, please call [phone].

## 2018-02-02 NOTE — PATIENT INSTRUCTIONS
[facility]   Statement to Patients  April 1, 2014      In an effort to ensure the large volume of patient prescription refills is processed in the most efficient and expeditious manner, we are asking our patients to assist us by calling your Pharmacy for all prescription refills, this will include also your  Mail Order Pharmacy. The pharmacy will contact our office electronically to continue the refill process. Please do not wait until the last minute to call your pharmacy. We need at least 48 hours (2days) to fill prescriptions. We also encourage you to call your pharmacy before going to  your prescription to make sure it is ready. With regard to controlled substance prescription refill requests (narcotic refills) that need to be picked up at our office, we ask your cooperation by providing us with at least 72 hours (3days) notice that you will need a refill. We will not refill narcotic prescription refill requests after 4:00pm on any weekday, Monday through Thursday, or after 2:00pm on Fridays, or on the weekends. We encourage everyone to explore another way of getting your prescription refill request processed using [employer], our patient web portal through our electronic medical record system. [employer] is an efficient and effective way to communicate your medication request directly to the office and  downloadable as an davis on your smart phone . [employer] also features a review functionality that allows you to view your medication list as well as leave messages for your physician. Are you ready to get connected? If so please review the attatched instructions or speak to any of our staff to get you set up right away! Thank you so much for your cooperation. Should you have any questions please contact our Practice Administrator.     The Physicians and Staff,  [facility] [address]  What is a living will?    A living will is a legal form you use to write down the kind of care you want at the end of your life. It is used by the health professionals who will treat you if you aren't able to decide for yourself. If you put your wishes in writing, your loved ones and others will know what kind of care you want. They won't need to guess. This can ease your mind and be helpful to others. A living will is not the same as an estate or property will. An estate will explains what you want to happen with your money and property after you die. Is a living will a legal document? A living will is a legal document. Each state has its own laws about living manuel. If you move to another state, make sure that your living will is legal in the state where you now live. Or you might use a universal form that has been approved by many states. This kind of form can sometimes be completed and stored online. Your electronic copy will then be available wherever you have a connection to the Internet. In most cases, doctors will respect your wishes even if you have a form from a different state. · You don't need an  to complete a living will. But legal advice can be helpful if your state's laws are unclear, your health history is complicated, or your family can't agree on what should be in your living will. · You can change your living will at any time. Some people find that their wishes about end-of-life care change as their health changes. · In addition to making a living will, think about completing a medical power of  form. This form lets you name the person you want to make end-of-life treatment decisions for you (your \"health care agent\") if you're not able to. Many hospitals and nursing homes will give you the forms you need to complete a living will and a medical power of . · Your living will is used only if you can't make or communicate decisions for yourself anymore.  If you become able to make decisions again, you can accept or refuse any treatment, no matter what you wrote in your living will. · Your state may offer an online registry. This is a place where you can store your living will online so the doctors and nurses who need to treat you can find it right away. What should you think about when creating a living will? Talk about your end-of-life wishes with your family members and your doctor. Let them know what you want. That way the people making decisions for you won't be surprised by your choices. Think about these questions as you make your living will:  · Do you know enough about life support methods that might be used? If not, talk to your doctor so you know what might be done if you can't breathe on your own, your heart stops, or you're unable to swallow. · What things would you still want to be able to do after you receive life-support methods? Would you want to be able to walk? To speak? To eat on your own? To live without the help of machines? · If you have a choice, where do you want to be cared for? In your home? At a hospital or nursing home? · Do you want certain Sikh practices performed if you become very ill? · If you have a choice at the end of your life, where would you prefer to die? At home? In a hospital or nursing home? Somewhere else? · Would you prefer to be buried or cremated? · Do you want your organs to be donated after you die? What should you do with your living will? · Make sure that your family members and your health care agent have copies of your living will. · Give your doctor a copy of your living will to keep in your medical record. If you have more than one doctor, make sure that each one has a copy. · You may want to put a copy of your living will where it can be easily found. Where can you learn more? Go to http://liss-wilder.info/. Enter Z384 in the search box to learn more about \"Learning About Living [place]. \"  Current as of: September 24, 2016  Content Version: 11.4  © 1830-7266 [employer]. Care instructions adapted under license by [employer] (which disclaims liability or warranty for this information). If you have questions about a medical condition or this instruction, always ask your healthcare professional. [address] any warranty or liability for your use of this information. Patient history reviewed and patient examined. Is doing remarkably status quo and I hope that continues. Continue on the Keppra extended release and currently is handling his MS by using good Neptune[employer] and staying reasonably busy.

## 2018-02-02 NOTE — PROGRESS NOTES
Neurology Consult      Subjective:      Anand Francisco is a 78 y.o. 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 Has done extremely well on Keppra extended release 500 mg taking 4 per day. I will not change that. He cannot recall the last time he had a seizure. Did have problems with the immediate release in terms of the peaks and troughs but no issues now. Primary progressive MS. Is doing reasonably well here although he knows his limitations and uses good common sense to stay out of trouble. Sometimes has to remember when he takes turns and other rapid response maneuvers that he has to slow down based on left leg performance. Had no issues with recent bad weather but stayed inside and let mother nature take care of the ice and snow in its own good time. Has not picked up on any new attributes of his disease and has not been on any therapeutic regimen given the nature of his MS and no hint of relapses or progression per se. Cognition is good and his mood and behavior has always been exemplary. Will see him back in 6 months. His exam for all intensive purposes was at baseline. Current Outpatient Prescriptions   Medication Sig Dispense Refill    levETIRAcetam (KEPPRA XR) 500 mg ER tablet TAKE 4 TABLETS EVERY MORNING 360 Tab 3    pravastatin (PRAVACHOL) 20 mg tablet Take 20 mg by mouth nightly.  lisinopril (PRINIVIL, ZESTRIL) 5 mg tablet Take  by mouth daily. No Known Allergies  Past Medical History:   Diagnosis Date    Autoimmune disease ([address]     MS    Hypertension     Ill-defined condition     incr.  cholesterol    [facility] [facility]     Dx in 2013    Seizures ([facility] [address]     last was 2/14/2012 (none since back on seizure med)      Past Surgical History:   Procedure Laterality Date    HX OTHER SURGICAL      wisdom teeth    HX TONSILLECTOMY        Social History Social History    Marital status:      Spouse name: N/A    Number of children: N/A    Years of education: N/A     Occupational History    Not on file. Social History Main Topics    Smoking status: Never Smoker    Smokeless tobacco: Never Used    Alcohol use No    Drug use: No    Sexual activity: Not on file     Other Topics Concern    Not on file     Social History Narrative      No family history on file. Visit Vitals    Pulse 77    Temp 98.7 °F (37.1 °C) (Oral)    Resp 18    Ht 5' 10\" (1.778 m)    Wt 100.4 kg (221 lb 6.4 oz)    SpO2 97%    BMI 31.77 kg/m2        Review of Systems:   A comprehensive review of systems was negative except for that written in the HPI. Neuro Exam:     Appearance: The patient is well developed, well nourished, provides a coherent history and is in no acute distress. Mental Status: Oriented to time, place and person. Mood and affect appropriate. Cranial Nerves:   Intact visual fields. Fundi are benign. BALDO, EOM's full, no nystagmus, no ptosis. Facial sensation is normal. Corneal reflexes are intact. Facial movement is symmetric. Hearing is normal bilaterally. Palate is midline with normal sternocleidomastoid and trapezius muscles are normal. Tongue is midline. Motor:  5/5 strength in upper and lower proximal and distal muscles except in the left leg where he varies between 4+-5-. Darnella Hopper Normal bulk and tone. No fasciculations. Reflexes:   Deep tendon reflexes 2+/4 and symmetrical except +3 below the waist and more enhanced left leg than right. .   Sensory:    Slightly diminished distally below the waist to touch, pinprick and vibration. Gait:   Patient has his baseline left leg stiffness and hesitancy step to step. Romberg negative   Tremor:   No tremor noted. Cerebellar:  No cerebellar signs present. Neurovascular:  Normal heart sounds and regular rhythm, peripheral pulses intact, and no carotid bruits.   Has 2-4 beats right ankle clonus and 46 beats left ankle clonus. Assessment:   Problem 1 complex partial seizures as a part of localization-related seizure not intractable and without status epilepticus. Continue Keppra extended release 500 mg and takes 4 per day. Problem 2 primary progressive MS. Is doing incredibly well and uses commonsense and stays is reasonably busy as he safely can. His exam looked status quo to me today. Plan:   Revisit 6 months.   Signed by :  Nicolas Pennington MD

## 2018-08-03 ENCOUNTER — OFFICE VISIT (OUTPATIENT)
Dept: NEUROLOGY | Age: 68
End: 2018-08-03

## 2018-08-03 VITALS
WEIGHT: 217 LBS | RESPIRATION RATE: 18 BRPM | SYSTOLIC BLOOD PRESSURE: 142 MMHG | HEIGHT: 70 IN | HEART RATE: 70 BPM | BODY MASS INDEX: 31.07 KG/M2 | DIASTOLIC BLOOD PRESSURE: 84 MMHG | OXYGEN SATURATION: 96 %

## 2018-08-03 DIAGNOSIS — G35 MULTIPLE SCLEROSIS (HCC): Primary | ICD-10-CM

## 2018-08-03 DIAGNOSIS — R56.9 FOCAL SEIZURES (HCC): ICD-10-CM

## 2018-08-03 NOTE — PROGRESS NOTES
1.  neurology Consult Subjective:  
  
Dion Mccloud is a 78 y.o. male who comes in today with established well-controlled focal seizures on Keppra extended release 500 mg 4 per day. Has not had a seizure in many years. No difficulties there. Has by diagnosis primary progressive MS and is on no therapy. Has noticed some increasing fatigue in recent times and he went through the usual and customary checklist to make sure we were not missing the obvious. Says he does snore but is not convinced he is on any agenda for sleep disordered breathing as an obstructive sleep apnea. If he changes his mind he knows where to find me. Has noticed not unexpectedly that with his weak and stiff left leg sometimes he has difficulties transitioning from different environments and may take an occasional stumble or stub his toe. No true falls as I know it. Bowel and bladder function cognition special sensory function otherwise intact and his recent eye checkup was unrevealing. Mood and behavior is good and will suggest a revisit in his usual and customary 6 month timeframe. I think his critical asked that is he has a strict understanding about what his limitations are at heart rates within those limits so he is not impulsive or gets in trouble. Current Outpatient Prescriptions Medication Sig Dispense Refill  levETIRAcetam (KEPPRA XR) 500 mg ER tablet TAKE 4 TABLETS EVERY MORNING 360 Tab 3  pravastatin (PRAVACHOL) 20 mg tablet Take 20 mg by mouth nightly.  lisinopril (PRINIVIL, ZESTRIL) 5 mg tablet Take  by mouth daily. No Known Allergies Past Medical History:  
Diagnosis Date  Autoimmune disease ([facility] [address] MS  
 Hypertension  Ill-defined condition   
 incr. cholesterol  MS (multiple sclerosis) ([facility] [address] Dx in 2013  Seizures ([facility] [address]   
 last was 2/14/2012 (none since back on seizure med) Past Surgical History:  
Procedure Laterality Date  HX OTHER SURGICAL    
 wisdom teeth  HX TONSILLECTOMY Social History Social History  Marital status:  Spouse name: N/A  
 Number of children: N/A  
 Years of education: N/A Occupational History  Not on file. Social History Main Topics  Smoking status: Never Smoker  Smokeless tobacco: Never Used  Alcohol use No  
 Drug use: No  
 Sexual activity: Not on file Other Topics Concern  Not on file Social History Narrative No family history on file. Visit Vitals  /84  Pulse 70  Resp 18  Ht 5' 10\" (1.778 m)  Wt 98.4 kg (217 lb)  SpO2 96%  BMI 31.14 kg/m2 Review of Systems: A comprehensive review of systems was negative except for that written in the HPI. Neuro Exam:  
 
Appearance: The patient is well developed, well nourished, provides a coherent history and is in no acute distress. Mental Status: Oriented to time, place and person. Mood and affect appropriate. Cranial Nerves:   Intact visual fields. Fundi are benign. BALDO, EOM's full, no nystagmus, no ptosis. Facial sensation is normal. Corneal reflexes are intact. Facial movement is symmetric. Hearing is normal bilaterally. Palate is midline with normal sternocleidomastoid and trapezius muscles are normal. Tongue is midline. Motor:  5/5 strength in upper and lower proximal and distal muscles except left leg function is 4+-5-/5. Candance Huger Normal bulk and tone. No fasciculations. Reflexes:   Deep tendon reflexes 2-3+/4 and symmetrical.  
Sensory:    Slightly diminished distally to touch, pinprick and vibration. Gait:   Patient's gait once again demonstrates some circumduction limping and stiffness about his left leg step to step compared to right. Tremor:   No tremor noted. Cerebellar:  No cerebellar signs present. Neurovascular:  Normal heart sounds and regular rhythm, peripheral pulses intact, and no carotid bruits. Assessment:  
#1 focal seizures.   Very well controlled for many years and will not alter the dosing on his Keppra extended release 500 mg 4/ day 2. Primary progressive MS. Untreated and has an element of fatigue and hopefully this will not escalate. Went for the usual and customary checklist in this regard including depression sleep infections overexertion energy conservation among others. He says he snores at this time but will monitor this so we do not miss obstructive sleep apnea and sleep disordered breathing. Plan:  
Revisit 6 months.  
Signed by :  Albertina Avila MD

## 2018-08-03 NOTE — PROGRESS NOTES
Reviewed record in preparation for visit and have necessary documentation Pt did not bring medication to office visit for review Information was given to pt on Advanced Directives, Living Will 
opportunity was given for questions

## 2018-08-03 NOTE — MR AVS SNAPSHOT
[address] [facility] 
 
 
 [place] [address] [id number] [phone] Patient: Diego Núñez MRN: [id number] LXT:[id number] Visit Information Date & Time Provider Department Dept. Phone Encounter #  
 8/3/2018 11:40 AM Lisa Palmer MD 3 [facility] Neurology [facility] [phone] [id number] Follow-up Instructions Return in about 6 months (around 2/3/2019). Follow-up and Disposition History Your Appointments 2/1/2019 11:40 AM  
Follow Up with Lisa Palmer MD  
[facility]) Appt Note: follow up MS   [place]   8/3/18 [place] [address] [id number] [phone]  
  
   
 [place] [address] [address] [address] Upcoming Health Maintenance Date Due Hepatitis C Screening 1950 DTaP/Tdap/Td series (1 - Tdap) 8/28/1972 FOBT Q 1 YEAR AGE 50-75 8/28/2001 ZOSTER VACCINE AGE 60> 6/28/2011 GLAUCOMA SCREENING Q2Y 8/28/2016 Pneumococcal 65+ Low/Medium Risk (1 of 2 - PCV13) 8/28/2016 MEDICARE YEARLY EXAM 3/14/2018 Influenza Age 5 to Adult 8/1/2018 Allergies as of 8/3/2018  Review Complete On: 8/3/2018 By: Lisa Palmer MD  
 No Known Allergies Current Immunizations  Never Reviewed No immunizations on file. Not reviewed this visit You Were Diagnosed With   
  
 Codes Comments Multiple sclerosis ([facility] [address]    -  Primary ICD-10-CM: G35 
ICD-9-CM: 422 Focal seizures ([facility] [address]     ICD-10-CM: R56.9 ICD-9-CM: 780.39 Vitals BP Pulse Resp Height(growth percentile) Weight(growth percentile) SpO2  
 142/84 70 18 5' 10\" (1.778 m) 217 lb (98.4 kg) 96% BMI Smoking Status 31.14 kg/m2 Never Smoker Vitals History BMI and BSA Data Body Mass Index Body Surface Area  
 31.14 kg/m 2 2.2 m 2 Preferred Pharmacy Pharmacy Name Phone Greg Campos [address] [place] [phone] [facility] [address] [phone] Your Updated Medication List  
  
   
This list is accurate as of 8/3/18 12:24 PM.  Always use your most recent med list.  
  
  
  
  
 levETIRAcetam 500 mg ER tablet Commonly known as:  KEPPRA XR  
TAKE 4 TABLETS EVERY MORNING  
  
 lisinopril 5 mg tablet Commonly known as:  Franki Bridges Take  by mouth daily. pravastatin 20 mg tablet Commonly known as:  PRAVACHOL Take 20 mg by mouth nightly. Follow-up Instructions Return in about 6 months (around 2/3/2019). Patient Instructions PRESCRIPTION REFILL POLICY [facility] Statement to Patients April 1, 2014 In an effort to ensure the large volume of patient prescription refills is processed in the most efficient and expeditious manner, we are asking our patients to assist us by calling your Pharmacy for all prescription refills, this will include also your  Mail Order Pharmacy. The pharmacy will contact our office electronically to continue the refill process. Please do not wait until the last minute to call your pharmacy. We need at least 48 hours (2days) to fill prescriptions. We also encourage you to call your pharmacy before going to  your prescription to make sure it is ready. With regard to controlled substance prescription refill requests (narcotic refills) that need to be picked up at our office, we ask your cooperation by providing us with at least 72 hours (3days) notice that you will need a refill. We will not refill narcotic prescription refill requests after 4:00pm on any weekday, Monday through Thursday, or after 2:00pm on Fridays, or on the weekends.   
  
We encourage everyone to explore another way of getting your prescription refill request processed using [employer], our patient web portal through our electronic medical record system. My Mega Bookstore is an efficient and effective way to communicate your medication request directly to the office and  downloadable as an davis on your smart phone . My Mega Bookstore also features a review functionality that allows you to view your medication list as well as leave messages for your physician. Are you ready to get connected? If so please review the attatched instructions or speak to any of our staff to get you set up right away! Thank you so much for your cooperation. Should you have any questions please contact our Practice Administrator. The Physicians and Staff,  [facility] Randy Londono [address] What is a living will? A living will is a legal form you use to write down the kind of care you want at the end of your life. It is used by the health professionals who will treat you if you aren't able to decide for yourself. If you put your wishes in writing, your loved ones and others will know what kind of care you want. They won't need to guess. This can ease your mind and be helpful to others. A living will is not the same as an estate or property will. An estate will explains what you want to happen with your money and property after you die. Is a living will a legal document? A living will is a legal document. Each state has its own laws about living manuel. If you move to another state, make sure that your living will is legal in the state where you now live. Or you might use a universal form that has been approved by many states. This kind of form can sometimes be completed and stored online. Your electronic copy will then be available wherever you have a connection to the Internet. In most cases, doctors will respect your wishes even if you have a form from a different state. · You don't need an  to complete a living will.  But legal advice can be helpful if your state's laws are unclear, your health history is complicated, or your family can't agree on what should be in your living will. · You can change your living will at any time. Some people find that their wishes about end-of-life care change as their health changes. · In addition to making a living will, think about completing a medical power of  form. This form lets you name the person you want to make end-of-life treatment decisions for you (your \"health care agent\") if you're not able to. Many hospitals and nursing homes will give you the forms you need to complete a living will and a medical power of . · Your living will is used only if you can't make or communicate decisions for yourself anymore. If you become able to make decisions again, you can accept or refuse any treatment, no matter what you wrote in your living will. · Your state may offer an online registry. This is a place where you can store your living will online so the doctors and nurses who need to treat you can find it right away. What should you think about when creating a living will? Talk about your end-of-life wishes with your family members and your doctor. Let them know what you want. That way the people making decisions for you won't be surprised by your choices. Think about these questions as you make your living will: · Do you know enough about life support methods that might be used? If not, talk to your doctor so you know what might be done if you can't breathe on your own, your heart stops, or you're unable to swallow. · What things would you still want to be able to do after you receive life-support methods? Would you want to be able to walk? To speak? To eat on your own? To live without the help of machines? · If you have a choice, where do you want to be cared for? In your home? At a hospital or nursing home? · Do you want certain Adventist practices performed if you become very ill? · If you have a choice at the end of your life, where would you prefer to die? At home? In a hospital or nursing home? Somewhere else? · Would you prefer to be buried or cremated? · Do you want your organs to be donated after you die? What should you do with your living will? · Make sure that your family members and your health care agent have copies of your living will. · Give your doctor a copy of your living will to keep in your medical record. If you have more than one doctor, make sure that each one has a copy. · You may want to put a copy of your living will where it can be easily found. Where can you learn more? Go to http://liss-wilder.info/. Enter K564 in the search box to learn more about \"Learning About Living Perroy. \" Current as of: October 6, 2017 Content Version: 11.7 © 0270-8863 [employer]. Care instructions adapted under license by [employer] (which disclaims liability or warranty for this information). If you have questions about a medical condition or this instruction, always ask your healthcare professional. [address] any warranty or liability for your use of this information. Patient history reviewed and patient examined. Will suggest continuation of Keppra as before and hopefully this fatigue factor will not escalate as time goes on. Revisit 6 months. Patient Instructions History Introducing hospitals & HEALTH SERVICES! [facility] introduces [employer] patient portal. Now you can access parts of your medical record, email your doctor's office, and request medication refills online. 1. In your internet browser, go to [email or web address] 2. Click on the First Time User? Click Here link in the Sign In box. You will see the New Member Sign Up page. 3. Enter your [employer] Access Code exactly as it appears below. You will not need to use this code after youve completed the sign-up process.  If you do not sign up before the expiration date, you must request a new code. · [employer] Access Code: [email or web address] Expires: 11/1/2018 12:20 PM 
 
4. Enter the last four digits of your Social Security Number (xxxx) and Date of Birth (mm/dd/yyyy) as indicated and click Submit. You will be taken to the next sign-up page. 5. Create a [employer] ID. This will be your [employer] login ID and cannot be changed, so think of one that is secure and easy to remember. 6. Create a [employer] password. You can change your password at any time. 7. Enter your Password Reset Question and Answer. This can be used at a later time if you forget your password. 8. Enter your e-mail address. You will receive e-mail notification when new information is available in [address] 9. Click Sign Up. You can now view and download portions of your medical record. 10. Click the Download Summary menu link to download a portable copy of your medical information. If you have questions, please visit the Frequently Asked Questions section of the [employer] website. Remember, [employer] is NOT to be used for urgent needs. For medical emergencies, dial 911. Now available from your iPhone and Android! Please provide this summary of care documentation to your next provider. Your [facility]. If you have any questions after today's visit, please call [phone].

## 2018-08-03 NOTE — PATIENT INSTRUCTIONS
PRESCRIPTION REFILL POLICY [facility] Statement to Patients April 1, 2014 In an effort to ensure the large volume of patient prescription refills is processed in the most efficient and expeditious manner, we are asking our patients to assist us by calling your Pharmacy for all prescription refills, this will include also your  Mail Order Pharmacy. The pharmacy will contact our office electronically to continue the refill process. Please do not wait until the last minute to call your pharmacy. We need at least 48 hours (2days) to fill prescriptions. We also encourage you to call your pharmacy before going to  your prescription to make sure it is ready. With regard to controlled substance prescription refill requests (narcotic refills) that need to be picked up at our office, we ask your cooperation by providing us with at least 72 hours (3days) notice that you will need a refill. We will not refill narcotic prescription refill requests after 4:00pm on any weekday, Monday through Thursday, or after 2:00pm on Fridays, or on the weekends. We encourage everyone to explore another way of getting your prescription refill request processed using [employer], our patient web portal through our electronic medical record system. [employer] is an efficient and effective way to communicate your medication request directly to the office and  downloadable as an davis on your smart phone . [employer] also features a review functionality that allows you to view your medication list as well as leave messages for your physician. Are you ready to get connected? If so please review the attatched instructions or speak to any of our staff to get you set up right away! Thank you so much for your cooperation. Should you have any questions please contact our Practice Administrator. The Physicians and Staff,  [facility] Randy Londono [address] What is a living will? 
 
A living will is a legal form you use to write down the kind of care you want at the end of your life. It is used by the health professionals who will treat you if you aren't able to decide for yourself. If you put your wishes in writing, your loved ones and others will know what kind of care you want. They won't need to guess. This can ease your mind and be helpful to others. A living will is not the same as an estate or property will. An estate will explains what you want to happen with your money and property after you die. Is a living will a legal document? A living will is a legal document. Each state has its own laws about living manuel. If you move to another state, make sure that your living will is legal in the state where you now live. Or you might use a universal form that has been approved by many states. This kind of form can sometimes be completed and stored online. Your electronic copy will then be available wherever you have a connection to the Internet. In most cases, doctors will respect your wishes even if you have a form from a different state. · You don't need an  to complete a living will. But legal advice can be helpful if your state's laws are unclear, your health history is complicated, or your family can't agree on what should be in your living will. · You can change your living will at any time. Some people find that their wishes about end-of-life care change as their health changes. · In addition to making a living will, think about completing a medical power of  form. This form lets you name the person you want to make end-of-life treatment decisions for you (your \"health care agent\") if you're not able to. Many hospitals and nursing homes will give you the forms you need to complete a living will and a medical power of . · Your living will is used only if you can't make or communicate decisions for yourself anymore.  If you become able to make decisions again, you can accept or refuse any treatment, no matter what you wrote in your living will. · Your state may offer an online registry. This is a place where you can store your living will online so the doctors and nurses who need to treat you can find it right away. What should you think about when creating a living will? Talk about your end-of-life wishes with your family members and your doctor. Let them know what you want. That way the people making decisions for you won't be surprised by your choices. Think about these questions as you make your living will: · Do you know enough about life support methods that might be used? If not, talk to your doctor so you know what might be done if you can't breathe on your own, your heart stops, or you're unable to swallow. · What things would you still want to be able to do after you receive life-support methods? Would you want to be able to walk? To speak? To eat on your own? To live without the help of machines? · If you have a choice, where do you want to be cared for? In your home? At a hospital or nursing home? · Do you want certain Protestant practices performed if you become very ill? · If you have a choice at the end of your life, where would you prefer to die? At home? In a hospital or nursing home? Somewhere else? · Would you prefer to be buried or cremated? · Do you want your organs to be donated after you die? What should you do with your living will? · Make sure that your family members and your health care agent have copies of your living will. · Give your doctor a copy of your living will to keep in your medical record. If you have more than one doctor, make sure that each one has a copy. · You may want to put a copy of your living will where it can be easily found. Where can you learn more? Go to http://liss-wilder.info/. Enter D262 in the search box to learn more about \"Learning About Living Nadir Johnson. \" Current as of: October 6, 2017 Content Version: 11.7 © 9664-2408 [employer]. Care instructions adapted under license by [employer] (which disclaims liability or warranty for this information). If you have questions about a medical condition or this instruction, always ask your healthcare professional. [address] any warranty or liability for your use of this information. Patient history reviewed and patient examined. Will suggest continuation of Keppra as before and hopefully this fatigue factor will not escalate as time goes on. Revisit 6 months.

## 2018-11-15 RX ORDER — LEVETIRACETAM 500 MG/1
TABLET, EXTENDED RELEASE ORAL
Qty: 360 TAB | Refills: 3 | Status: SHIPPED | OUTPATIENT
Start: 2018-11-15 | End: 2020-02-07 | Stop reason: SDUPTHER

## 2019-02-01 ENCOUNTER — OFFICE VISIT (OUTPATIENT)
Dept: NEUROLOGY | Age: 69
End: 2019-02-01

## 2019-02-01 VITALS
BODY MASS INDEX: 31.35 KG/M2 | WEIGHT: 219 LBS | DIASTOLIC BLOOD PRESSURE: 82 MMHG | HEIGHT: 70 IN | OXYGEN SATURATION: 98 % | SYSTOLIC BLOOD PRESSURE: 140 MMHG | HEART RATE: 78 BPM

## 2019-02-01 DIAGNOSIS — G35 MULTIPLE SCLEROSIS (HCC): Primary | ICD-10-CM

## 2019-02-01 DIAGNOSIS — G40.009 LOCALIZATION-RELATED (FOCAL) (PARTIAL) IDIOPATHIC EPILEPSY AND EPILEPTIC SYNDROMES WITH SEIZURES OF LOCALIZED ONSET, NOT INTRACTABLE, WITHOUT STATUS EPILEPTICUS (HCC): ICD-10-CM

## 2019-02-01 NOTE — PROGRESS NOTES
Problem 1 neurology Consult Subjective:  
  
Sofi Pizarro is a 80 y.o. 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 Is on Keppra extended release 500 mg 4/day. Also has primary progressive MS which at this point is mostly asymptomatic expression of left leg weakness that alters his transfers and gait performance. He has developed a very knowledgeable point of reference as to what his approach should be to transfers and gait. Has not gravitated to a cane at this point and offered him physical therapy but thinks he will think about this for now. Knows that on turns especially to his left he is extremely vulnerable to a potential fall and certainly a misstep. Also realizes that as he walks step to step that his action from his left knee down cannot keep pace with his right leg so has to walk somewhat stiffly and deliberately to stay out of trouble. Mentions no new problems and did not need a refill on his Keppra extended release today. Suggest revisit in 6 months. Knows he can call me between visits without a face-to-face to get the physical therapy referral. 
 
 
 
Current Outpatient Medications Medication Sig Dispense Refill  levETIRAcetam (KEPPRA XR) 500 mg ER tablet TAKE 4 TABLETS EVERY MORNING 360 Tab 3  pravastatin (PRAVACHOL) 20 mg tablet Take 20 mg by mouth nightly.  lisinopril (PRINIVIL, ZESTRIL) 5 mg tablet Take  by mouth daily. No Known Allergies Past Medical History:  
Diagnosis Date  Autoimmune disease ([address] MS  
 Hypertension  Ill-defined condition   
 incr. cholesterol  MS (multiple sclerosis) ([facility] [address] Dx in 2013  Seizures ([facility] [address]   
 last was 2/14/2012 (none since back on seizure med) Past Surgical History:  
Procedure Laterality Date  HX OTHER SURGICAL    
 wisdom teeth  HX TONSILLECTOMY Social History Socioeconomic History  Marital status:   
 Spouse name: Not on file  Number of children: Not on file  Years of education: Not on file  Highest education level: Not on file Social Needs  Financial resource strain: Not on file  Food insecurity - worry: Not on file  Food insecurity - inability: Not on file  Transportation needs - medical: Not on file  Transportation needs - non-medical: Not on file Occupational History  Not on file Tobacco Use  Smoking status: Never Smoker  Smokeless tobacco: Never Used Substance and Sexual Activity  Alcohol use: No  
 Drug use: No  
 Sexual activity: Not on file Other Topics Concern  Not on file Social History Narrative  Not on file History reviewed. No pertinent family history. Visit Vitals /82 (BP 1 Location: Left arm, BP Patient Position: Sitting) Pulse 78 Ht 5' 10\" (1.778 m) Wt 99.3 kg (219 lb) SpO2 98% BMI 31.42 kg/m² Review of Systems: A comprehensive review of systems was negative except for that written in the HPI. Neuro Exam:  
 
Appearance: The patient is well developed, well nourished, provides a coherent history and is in no acute distress. Mental Status: Oriented to time, place and person. Mood and affect appropriate. Cranial Nerves:   Intact visual fields. Fundi are benign. BALDO, EOM's full, no nystagmus, no ptosis. Facial sensation is normal. Corneal reflexes are intact. Facial movement is symmetric. Hearing is normal bilaterally. Palate is midline with normal sternocleidomastoid and trapezius muscles are normal. Tongue is midline. Motor:  5/5 strength in upper and in the lower proximal and distal muscles he is 4+-5- left leg proximal to distal and pretty much 5/5 right leg. Normal bulk and tone. No fasciculations. Reflexes:   Deep tendon reflexes 2-3+/4 and symmetrical.  
Sensory:    Diminished distally to touch, pinprick and vibration. Gait:   Patient has his baseline left leg paretic posturing step to step. Tremor:   No tremor noted. Cerebellar:  No cerebellar signs present. Neurovascular:  Normal heart sounds and regular rhythm, peripheral pulses intact, and no carotid bruits. Patient has 4+ beats of clonus on each side left foot greater than right. Assessment:  
Localization-related seizures not intractable not status epilepticus. Continue on the Keppra extended release 500 mg 4/day. Primary progressive MS. I did not see a great deal of difference in the exam today and offered him physical therapy evaluation of transfer and gait safety maintenance and stamina. He says he will think about this for a while. Plan:  
Revisit 6 months.   Signed by :  Ania Velarde MD

## 2019-02-01 NOTE — PATIENT INSTRUCTIONS
Patient history reviewed and patient examined. Patient doing well and accommodating to the left leg weakness as it applies to safe transfer and ambulation possibilities. Will be more than glad to refer to physical therapy if the notion is acceptable and continue the Keppra as is and revisit in 6 months.

## 2020-02-07 ENCOUNTER — TELEPHONE (OUTPATIENT)
Dept: NEUROLOGY | Age: 70
End: 2020-02-07

## 2020-02-07 ENCOUNTER — OFFICE VISIT (OUTPATIENT)
Dept: NEUROLOGY | Age: 70
End: 2020-02-07

## 2020-02-07 VITALS
WEIGHT: 224.2 LBS | DIASTOLIC BLOOD PRESSURE: 90 MMHG | OXYGEN SATURATION: 97 % | HEART RATE: 84 BPM | RESPIRATION RATE: 18 BRPM | BODY MASS INDEX: 32.1 KG/M2 | SYSTOLIC BLOOD PRESSURE: 138 MMHG | HEIGHT: 70 IN

## 2020-02-07 DIAGNOSIS — G35 MS (MULTIPLE SCLEROSIS) (HCC): ICD-10-CM

## 2020-02-07 DIAGNOSIS — R56.9 FOCAL SEIZURES (HCC): Primary | ICD-10-CM

## 2020-02-07 RX ORDER — LEVETIRACETAM 500 MG/1
TABLET, EXTENDED RELEASE ORAL
Qty: 360 TAB | Refills: 3 | Status: SHIPPED | OUTPATIENT
Start: 2020-02-07 | End: 2020-02-07 | Stop reason: SDUPTHER

## 2020-02-07 RX ORDER — LEVETIRACETAM 500 MG/1
500 TABLET, EXTENDED RELEASE ORAL DAILY
Qty: 120 TAB | Refills: 1 | Status: SHIPPED | OUTPATIENT
Start: 2020-02-07 | End: 2020-02-10 | Stop reason: CLARIF

## 2020-02-07 RX ORDER — LEVETIRACETAM 500 MG/1
TABLET, EXTENDED RELEASE ORAL
Qty: 360 TAB | Refills: 3 | Status: SHIPPED | OUTPATIENT
Start: 2020-02-07 | End: 2020-02-10 | Stop reason: CLARIF

## 2020-02-07 RX ORDER — LEVETIRACETAM 500 MG/1
500 TABLET, EXTENDED RELEASE ORAL DAILY
Qty: 120 TAB | Refills: 0 | Status: SHIPPED | OUTPATIENT
Start: 2020-02-07 | End: 2020-02-07 | Stop reason: SDUPTHER

## 2020-02-07 RX ORDER — LEVETIRACETAM 500 MG/1
500 TABLET, EXTENDED RELEASE ORAL DAILY
Qty: 360 TAB | Refills: 3 | Status: SHIPPED | OUTPATIENT
Start: 2020-02-07 | End: 2020-02-10 | Stop reason: DRUGHIGH

## 2020-02-07 NOTE — LETTER
2/9/20 Patient: Antonia Snow YOB: 1950 Date of Visit: 2/7/2020 Donny Francois MD 
[address] [facility] [address] [address] VIA Facsimile: [phone] Dear Donny Francois MD, Thank you for referring Mr. Rayray Jacobo to [address] for evaluation. My notes for this consultation are attached. If you have questions, please do not hesitate to call me. I look forward to following your patient along with you.  
 
 
Sincerely, 
 
Dong Winter MD

## 2020-02-07 NOTE — PROGRESS NOTES
Neurology Consult      Subjective:      Diego Núñez is a 77 y.o. male Who comes in today with long established history of well-controlled focal motor seizures by inference. Is on levetiracetam 500 mg extended release 4/day and that was renewed with a month's worth and refill to the local pharmacy and a quarterly supply with refills to [facility]. Says he is making peace with the left leg weakness and notices his limitations and is had no falls. He no longer takes 2 steps at a time but 1. He also notices that he slows down on turns and other activity where both legs have to work simultaneously and quickly and not straightahead. Bowel and bladder function is status quo although occasional constipation tendencies. Special sensory function intact. And has switched over to anthem so hopefully this transition on the previously referenced levetiracetam him will go very smoothly and to his credit more cost efficiently. Did not see a great deal of difference in his exam as it goes to his primary progressive MS. And we will see him back in 6 months. Current Outpatient Medications   Medication Sig Dispense Refill    levETIRAcetam (KEPPRA XR) 500 mg ER tablet TAKE 4 TABLETS EVERY MORNING 360 Tab 3    pravastatin (PRAVACHOL) 20 mg tablet Take 20 mg by mouth nightly.  lisinopril (PRINIVIL, ZESTRIL) 5 mg tablet Take  by mouth daily. No Known Allergies  Past Medical History:   Diagnosis Date    Autoimmune disease ([address]     MS    Hypertension     Ill-defined condition     incr.  cholesterol    MS (multiple sclerosis) (HCC)     Dx in 2013    Seizures ([facility]     last was 2/14/2012 (none since back on seizure med)      Past Surgical History:   Procedure Laterality Date    HX OTHER SURGICAL      wisdom teeth    HX TONSILLECTOMY        Social History     Socioeconomic History    Marital status:      Spouse name: Not on file    Number of children: Not on file    Years of education: Not on file  Highest education level: Not on file   Occupational History    Not on file   Social Needs    Financial resource strain: Not on file    Food insecurity:     Worry: Not on file     Inability: Not on file    Transportation needs:     Medical: Not on file     Non-medical: Not on file   Tobacco Use    Smoking status: Never Smoker    Smokeless tobacco: Never Used   Substance and Sexual Activity    Alcohol use: No    Drug use: No    Sexual activity: Not on file   Lifestyle    Physical activity:     Days per week: Not on file     Minutes per session: Not on file    Stress: Not on file   Relationships    Social connections:     Talks on phone: Not on file     Gets together: Not on file     Attends Orthodox service: Not on file     Active member of club or organization: Not on file     Attends meetings of clubs or organizations: Not on file     Relationship status: Not on file    Intimate partner violence:     Fear of current or ex partner: Not on file     Emotionally abused: Not on file     Physically abused: Not on file     Forced sexual activity: Not on file   Other Topics Concern    Not on file   Social History Narrative    Not on file      No family history on file. Visit Vitals  /90   Pulse 84   Resp 18   Ht 5' 10\" (1.778 m)   Wt 101.7 kg (224 lb 3.2 oz)   SpO2 97%   BMI 32.17 kg/m²        Review of Systems:   A comprehensive review of systems was negative except for that written in the HPI. Neuro Exam:     Appearance: The patient is well developed, well nourished, provides a coherent history and is in no acute distress. Mental Status: Oriented to time, place and person. Mood and affect appropriate. Cranial Nerves:   Intact visual fields. Fundi are benign. BALDO, EOM's full, no nystagmus, no ptosis. Facial sensation is normal. Corneal reflexes are intact. Facial movement is symmetric. Hearing is normal bilaterally.  Palate is midline with normal sternocleidomastoid and trapezius muscles are normal. Tongue is midline. Motor:  5/5 strength in upper and in the lower proximal and distal muscles he is once again 4+ to 5- left leg. Normal bulk and tone. No fasciculations. Reflexes:   Deep tendon reflexes 2-3+/4 and symmetrical.   Sensory:   Normal to touch, pinprick and vibration. Gait:   As before when he walks he has slight circumduction of the left leg and stiffness step to step compared to his right leg. Tremor:   No tremor noted. Cerebellar:  No cerebellar signs present. Neurovascular:  Normal heart sounds and regular rhythm, peripheral pulses intact, and no carotid bruits. Assessment:   History of focal seizures. Will renew the Keppra extended release 500 mg 4/day and route a months worth with a refill to his local pharmacy and send the quarterly refills to [facility]. Hopefully there will be any difficulties on this transition. Primary progressive MS. Is not on any medication at this point and as I said he is holding his own. Plan:   Revisit 6 months.   Signed by :  Barbara Rojo MD

## 2020-02-07 NOTE — PATIENT INSTRUCTIONS
Patient history reviewed patient examined. Will suggest continuation of Keppra as before and so far as I see it he is maintaining his same performance on feet and will watch out for the right leg for overuse injury and its compensatory strategies to settle left leg dysfunction. Revisit 6 months.

## 2020-02-10 RX ORDER — LEVETIRACETAM 500 MG/1
TABLET, EXTENDED RELEASE ORAL
Qty: 360 TAB | Refills: 3 | Status: SHIPPED | OUTPATIENT
Start: 2020-02-10 | End: 2020-12-01

## 2020-08-07 ENCOUNTER — OFFICE VISIT (OUTPATIENT)
Dept: NEUROLOGY | Age: 70
End: 2020-08-07
Payer: MEDICARE

## 2020-08-07 VITALS
WEIGHT: 221 LBS | TEMPERATURE: 97.9 F | OXYGEN SATURATION: 97 % | HEART RATE: 77 BPM | RESPIRATION RATE: 16 BRPM | SYSTOLIC BLOOD PRESSURE: 148 MMHG | HEIGHT: 70 IN | DIASTOLIC BLOOD PRESSURE: 84 MMHG | BODY MASS INDEX: 31.64 KG/M2

## 2020-08-07 DIAGNOSIS — G40.009 LOCALIZATION-RELATED (FOCAL) (PARTIAL) IDIOPATHIC EPILEPSY AND EPILEPTIC SYNDROMES WITH SEIZURES OF LOCALIZED ONSET, NOT INTRACTABLE, WITHOUT STATUS EPILEPTICUS (HCC): ICD-10-CM

## 2020-08-07 DIAGNOSIS — G35 MS (MULTIPLE SCLEROSIS) (HCC): Primary | ICD-10-CM

## 2020-08-07 PROCEDURE — 3017F COLORECTAL CA SCREEN DOC REV: CPT | Performed by: SPECIALIST

## 2020-08-07 PROCEDURE — G8536 NO DOC ELDER MAL SCRN: HCPCS | Performed by: SPECIALIST

## 2020-08-07 PROCEDURE — G8432 DEP SCR NOT DOC, RNG: HCPCS | Performed by: SPECIALIST

## 2020-08-07 PROCEDURE — G8427 DOCREV CUR MEDS BY ELIG CLIN: HCPCS | Performed by: SPECIALIST

## 2020-08-07 PROCEDURE — 1101F PT FALLS ASSESS-DOCD LE1/YR: CPT | Performed by: SPECIALIST

## 2020-08-07 PROCEDURE — G8417 CALC BMI ABV UP PARAM F/U: HCPCS | Performed by: SPECIALIST

## 2020-08-07 PROCEDURE — 99214 OFFICE O/P EST MOD 30 MIN: CPT | Performed by: SPECIALIST

## 2020-08-07 NOTE — PROGRESS NOTES
Neurology Consult      Subjective:      Sonal Sampson is a 77 y.o. 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 No seizure breakthroughs in many years. He will continue on the levetiracetam extended release 500 mg 4/day. Gets good sleep minimize distress and stays compliant with his medicine. No tolerability issues in this regard. Primary progressive MS. Has his baseline left leg weakness and circumduction limping on that extremity step to step. Does feel subjectively that he has slow progression of weakness by the way he accommodates the leg on transfers and gait but no falling. No pain component. Was not committed to any immunosuppressant therapy as he was not interested in [address] [place] [address] nor similar immune modulating therapies. Was worried about his response to the coronavirus if that has to be the case but on the basis of his MS alone he should be okay and he is not on any immunosuppressive therapy so he should be able to default to his immune surveillance and deal with it like the normal population. Current Outpatient Medications   Medication Sig Dispense Refill    levETIRAcetam (KEPPRA XR) 500 mg ER tablet TAKE 4 TABLETS EVERY MORNING 360 Tab 3    pravastatin (PRAVACHOL) 20 mg tablet Take 20 mg by mouth nightly.  lisinopril (PRINIVIL, ZESTRIL) 5 mg tablet Take  by mouth daily. No Known Allergies  Past Medical History:   Diagnosis Date    Autoimmune disease ([address]     MS    Hypertension     Ill-defined condition     incr.  cholesterol    MS (multiple sclerosis) ([facility]     Dx in 2013    Seizures ([facility]     last was 2/14/2012 (none since back on seizure med)      Past Surgical History:   Procedure Laterality Date    HX OTHER SURGICAL      wisdom teeth    HX TONSILLECTOMY        Social History     Socioeconomic History    Marital status:      Spouse name: Not on file    Number of children: Not on file    Years of education: Not on file    Highest education level: Not on file   Occupational History    Not on file   Social Needs    Financial resource strain: Not on file    Food insecurity     Worry: Not on file     Inability: Not on file    Transportation needs     Medical: Not on file     Non-medical: Not on file   Tobacco Use    Smoking status: Never Smoker    Smokeless tobacco: Never Used   Substance and Sexual Activity    Alcohol use: No    Drug use: No    Sexual activity: Not on file   Lifestyle    Physical activity     Days per week: Not on file     Minutes per session: Not on file    Stress: Not on file   Relationships    Social connections     Talks on phone: Not on file     Gets together: Not on file     Attends Restorationism service: Not on file     Active member of club or organization: Not on file     Attends meetings of clubs or organizations: Not on file     Relationship status: Not on file    Intimate partner violence     Fear of current or ex partner: Not on file     Emotionally abused: Not on file     Physically abused: Not on file     Forced sexual activity: Not on file   Other Topics Concern    Not on file   Social History Narrative    Not on file      No family history on file. Visit Vitals  /84   Pulse 77   Temp 97.9 °F (36.6 °C)   Resp 16   Ht 5' 10\" (1.778 m)   Wt 100.2 kg (221 lb)   SpO2 97%   BMI 31.71 kg/m²        Review of Systems:   A comprehensive review of systems was negative except for that written in the HPI. Neuro Exam:     Appearance: The patient is well developed, well nourished, provides a coherent history and is in no acute distress. Mental Status: Oriented to time, place and person. Mood and affect appropriate. Cranial Nerves:   Intact visual fields. Fundi are benign. BALDO, EOM's full, no nystagmus, no ptosis. Facial sensation is normal. Corneal reflexes are intact. Facial movement is symmetric. Hearing is normal bilaterally.  Palate is midline with normal sternocleidomastoid and trapezius muscles are normal. Tongue is midline. Motor:  5/5 strength in upper and lower proximal and distal muscles except breakthrough weakness left lower extremity of 4+ to 5- category. Normal bulk and tone. No fasciculations. Reflexes:   Deep tendon reflexes 2-3+/4 and symmetrical.   Sensory:   Normal to touch, pinprick and vibration except diminished left leg. Gait:   Patient has circumduction limping on left leg compared to right. Tremor:   No tremor noted. Cerebellar:  No cerebellar signs present. Neurovascular:  Normal heart sounds and regular rhythm, peripheral pulses intact, and no carotid bruits. Assessment:   Problem 1 localization related seizures complex partial not intractable not status epilepticus. Continue on the levetiracetam extended release 500 mg 4/day. Get good sleep minimize stress and stay compliant with medicine. Problem 2 primary progressive MS. On no IMD and reassured him that his native immune system is capable of dealing with the coronavirus and the fact that he is not on any immunosuppressive drugs is a stronger statement to that effect. Plan:   Revisit 6 months.   Signed by :  Miriam Garcia MD

## 2020-08-07 NOTE — LETTER
8/7/20 Patient: Jacinto Vernon YOB: 1950 Date of Visit: 8/7/2020 Mike Stallings MD 
[address] [facility] [place] [address] VIA Facsimile: [phone] Dear Mike Stallings MD, Thank you for referring Mr. Oneyda Ledezmas to [address] [place] [address] [address]  for evaluation. My notes for this consultation are attached. If you have questions, please do not hesitate to call me. I look forward to following your patient along with you.  
 
 
Sincerely, 
 
Ambreen Waldrop MD

## 2020-08-07 NOTE — PATIENT INSTRUCTIONS
Patient's history reviewed patient examined. I thought the patient turned in his routine exam and that includes no recurrent seizure history. Will be due for medication refill somewhere at the beginning of next year. Recommend getting good sleep minimizing stress and staying compliant with medicine. Stay safe and try to enjoy the holidays.

## 2020-12-01 RX ORDER — LEVETIRACETAM 500 MG/1
TABLET, EXTENDED RELEASE ORAL
Qty: 360 TAB | Refills: 3 | Status: SHIPPED | OUTPATIENT
Start: 2020-12-01 | End: 2021-09-21

## 2021-02-05 ENCOUNTER — OFFICE VISIT (OUTPATIENT)
Dept: NEUROLOGY | Age: 71
End: 2021-02-05
Payer: MEDICARE

## 2021-02-05 VITALS
DIASTOLIC BLOOD PRESSURE: 88 MMHG | HEART RATE: 78 BPM | WEIGHT: 278 LBS | TEMPERATURE: 97 F | BODY MASS INDEX: 39.89 KG/M2 | SYSTOLIC BLOOD PRESSURE: 130 MMHG | RESPIRATION RATE: 16 BRPM | OXYGEN SATURATION: 97 %

## 2021-02-05 DIAGNOSIS — G35 MULTIPLE SCLEROSIS (HCC): ICD-10-CM

## 2021-02-05 DIAGNOSIS — G40.009 LOCALIZATION-RELATED (FOCAL) (PARTIAL) IDIOPATHIC EPILEPSY AND EPILEPTIC SYNDROMES WITH SEIZURES OF LOCALIZED ONSET, NOT INTRACTABLE, WITHOUT STATUS EPILEPTICUS (HCC): Primary | ICD-10-CM

## 2021-02-05 PROCEDURE — 1101F PT FALLS ASSESS-DOCD LE1/YR: CPT | Performed by: SPECIALIST

## 2021-02-05 PROCEDURE — 99214 OFFICE O/P EST MOD 30 MIN: CPT | Performed by: SPECIALIST

## 2021-02-05 PROCEDURE — G8432 DEP SCR NOT DOC, RNG: HCPCS | Performed by: SPECIALIST

## 2021-02-05 PROCEDURE — G8417 CALC BMI ABV UP PARAM F/U: HCPCS | Performed by: SPECIALIST

## 2021-02-05 PROCEDURE — G8427 DOCREV CUR MEDS BY ELIG CLIN: HCPCS | Performed by: SPECIALIST

## 2021-02-05 PROCEDURE — G8536 NO DOC ELDER MAL SCRN: HCPCS | Performed by: SPECIALIST

## 2021-02-05 PROCEDURE — 3017F COLORECTAL CA SCREEN DOC REV: CPT | Performed by: SPECIALIST

## 2021-02-05 RX ORDER — LISINOPRIL 20 MG/1
TABLET ORAL
COMMUNITY
Start: 2021-01-28

## 2021-02-05 NOTE — PATIENT INSTRUCTIONS
PRESCRIPTION REFILL POLICY [facility] Statement to Patients April 1, 2014 In an effort to ensure the large volume of patient prescription refills is processed in the most efficient and expeditious manner, we are asking our patients to assist us by calling your Pharmacy for all prescription refills, this will include also your  Mail Order Pharmacy. The pharmacy will contact our office electronically to continue the refill process. Please do not wait until the last minute to call your pharmacy. We need at least 48 hours (2days) to fill prescriptions. We also encourage you to call your pharmacy before going to  your prescription to make sure it is ready. With regard to controlled substance prescription refill requests (narcotic refills) that need to be picked up at our office, we ask your cooperation by providing us with at least 72 hours (3days) notice that you will need a refill. We will not refill narcotic prescription refill requests after 4:00pm on any weekday, Monday through Thursday, or after 2:00pm on Fridays, or on the weekends. We encourage everyone to explore another way of getting your prescription refill request processed using [employer], our patient web portal through our electronic medical record system. [employer] is an efficient and effective way to communicate your medication request directly to the office and  downloadable as an davis on your smart phone . [employer] also features a review functionality that allows you to view your medication list as well as leave messages for your physician. Are you ready to get connected? If so please review the attatched instructions or speak to any of our staff to get you set up right away! Thank you so much for your cooperation. Should you have any questions please contact our Practice Administrator. The Physicians and Staff,  [facility] Patient history reviewed patient examined. No seizures in quite a few years and recommend continuation of the Keppra extended release 500 taking 4/day. Has noticed some maturation of weakness and fatigue in the left leg especially as a goes to certain situational timeframes in the day. Will think about physical therapy at this point but knows how to pace himself and I think that is a difference between working through the day versus being fall prone and overextended himself. We will see him in 6 months.

## 2021-02-05 NOTE — LETTER
2/5/2021 Patient: Isidro Stark YOB: 1950 Date of Visit: 2/5/2021 Cydney Bray, [address] [place] [address] [address] Via Fax: [phone] Dear Cydney Bray DO, Thank you for referring Mr. Miley Armendariz to [facility] [address] for evaluation. My notes for this consultation are attached. If you have questions, please do not hesitate to call me. I look forward to following your patient along with you.  
 
 
Sincerely, 
 
Cydney Frankel MD

## 2021-02-05 NOTE — PROGRESS NOTES
Neurology Consult      Subjective:      Ramesh James is a 72 y.o. male who comes in today for his usual follow-up. 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 No downside from taking the medicine. 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  Has noticed on occasions especially later in the day and with repetitions that it takes more concerted effort and concentration to have that leg performed properly. Has avoided falls because he knows his limits and does not push his luck. Was offered physical therapy but at this time politely declines. He thinks a straight cane would actually be a handicap at this point in his performance. Will rely on his best judgment and will see him back in 6 months. Current Outpatient Medications   Medication Sig Dispense Refill    lisinopriL (PRINIVIL, ZESTRIL) 20 mg tablet       levETIRAcetam (KEPPRA XR) 500 mg ER tablet TAKE 4 TABLETS EVERY       MORNING 360 Tab 3    pravastatin (PRAVACHOL) 20 mg tablet Take 20 mg by mouth nightly.  lisinopril (PRINIVIL, ZESTRIL) 5 mg tablet Take  by mouth daily. No Known Allergies  Past Medical History:   Diagnosis Date    Autoimmune disease ([facility]     MS    Hypertension     Ill-defined condition     incr.  cholesterol    MS (multiple sclerosis) (HCC)     Dx in 2013    Seizures ([facility]     last was 2/14/2012 (none since back on seizure med)      Past Surgical History:   Procedure Laterality Date    HX OTHER SURGICAL      wisdom teeth    HX TONSILLECTOMY        Social History     Socioeconomic History    Marital status:      Spouse name: Not on file    Number of children: Not on file    Years of education: Not on file    Highest education level: Not on file   Occupational History    Not on file   Social Needs    Financial resource strain: Not on file    Food insecurity     Worry: Not on file     Inability: Not on file    Transportation needs     Medical: Not on file     Non-medical: Not on file   Tobacco Use    Smoking status: Never Smoker    Smokeless tobacco: Never Used   Substance and Sexual Activity    Alcohol use: No    Drug use: No    Sexual activity: Not on file   Lifestyle    Physical activity     Days per week: Not on file     Minutes per session: Not on file    Stress: Not on file   Relationships    Social connections     Talks on phone: Not on file     Gets together: Not on file     Attends Sabianist service: Not on file     Active member of club or organization: Not on file     Attends meetings of clubs or organizations: Not on file     Relationship status: Not on file    Intimate partner violence     Fear of current or ex partner: Not on file     Emotionally abused: Not on file     Physically abused: Not on file     Forced sexual activity: Not on file   Other Topics Concern    Not on file   Social History Narrative    Not on file      No family history on file. Visit Vitals  /88 (BP 1 Location: Left upper arm, BP Patient Position: Sitting, BP Cuff Size: Large adult)   Pulse 78   Temp 97 °F (36.1 °C)   Resp 16   Wt 126.1 kg (278 lb)   SpO2 97%   BMI 39.89 kg/m²        Review of Systems:   A comprehensive review of systems was negative except for that written in the HPI. Neuro Exam:     Appearance: The patient is well developed, well nourished, provides a coherent history and is in no acute distress. Mental Status: Oriented to time, place and person. Mood and affect appropriate. Cranial Nerves:   Intact visual fields. Fundi are benign. BALDO, EOM's full, no nystagmus, no ptosis. Facial sensation is normal. Corneal reflexes are intact. Facial movement is symmetric. Hearing is normal bilaterally. Palate is midline with normal sternocleidomastoid and trapezius muscles are normal. Tongue is midline. Motor:  5/5 strength in upper and lower proximal and distal muscles with the exception of the left leg which again is 4+ to 5 -. . Normal bulk and tone. No fasciculations. Reflexes:   Deep tendon reflexes 2-3+/4 and symmetrical.   Sensory:   Normal to touch, pinprick and vibration. Gait:   Patient's gait is manifest as a slight limping on the left leg and on turns and inherent tendency for the lower leg to flare as he turns around. Tremor:   No tremor noted. Cerebellar:  No cerebellar signs present. Neurovascular:  Normal heart sounds and regular rhythm, peripheral pulses intact, and no carotid bruits. 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 Will recommend continuation of the Keppra extended release 500 mg 4/day. Cannot remember his last seizure. Problem 2 multiple sclerosis. Has a very isolated cord lesion that has designs on his left leg performance. Looks like by virtue of aging and fatigue find some days more symptomatic than others. Does an excellent job of being able to pace himself and avoid costly falls. Was offered physical therapy but will think about it. Currently not interested in a cane and thinks it would be more of a distraction and handicap at this point. Plan:   Revisit 6 months.   Signed by :  Filomena Bennett MD

## 2021-08-06 ENCOUNTER — OFFICE VISIT (OUTPATIENT)
Dept: NEUROLOGY | Age: 71
End: 2021-08-06
Payer: MEDICARE

## 2021-08-06 VITALS
HEART RATE: 80 BPM | OXYGEN SATURATION: 97 % | WEIGHT: 206 LBS | DIASTOLIC BLOOD PRESSURE: 82 MMHG | HEIGHT: 70 IN | SYSTOLIC BLOOD PRESSURE: 142 MMHG | BODY MASS INDEX: 29.49 KG/M2

## 2021-08-06 DIAGNOSIS — G40.009 LOCALIZATION-RELATED (FOCAL) (PARTIAL) IDIOPATHIC EPILEPSY AND EPILEPTIC SYNDROMES WITH SEIZURES OF LOCALIZED ONSET, NOT INTRACTABLE, WITHOUT STATUS EPILEPTICUS (HCC): ICD-10-CM

## 2021-08-06 DIAGNOSIS — G35 MS (MULTIPLE SCLEROSIS) (HCC): Primary | ICD-10-CM

## 2021-08-06 PROCEDURE — 1101F PT FALLS ASSESS-DOCD LE1/YR: CPT | Performed by: SPECIALIST

## 2021-08-06 PROCEDURE — 3017F COLORECTAL CA SCREEN DOC REV: CPT | Performed by: SPECIALIST

## 2021-08-06 PROCEDURE — G8427 DOCREV CUR MEDS BY ELIG CLIN: HCPCS | Performed by: SPECIALIST

## 2021-08-06 PROCEDURE — 99213 OFFICE O/P EST LOW 20 MIN: CPT | Performed by: SPECIALIST

## 2021-08-06 PROCEDURE — G8536 NO DOC ELDER MAL SCRN: HCPCS | Performed by: SPECIALIST

## 2021-08-06 PROCEDURE — G8510 SCR DEP NEG, NO PLAN REQD: HCPCS | Performed by: SPECIALIST

## 2021-08-06 PROCEDURE — G8417 CALC BMI ABV UP PARAM F/U: HCPCS | Performed by: SPECIALIST

## 2021-08-06 NOTE — PROGRESS NOTES
Neurology Consult      Subjective:      Ayala Millan is a 72 y.o. 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 Is on his usual and customary levetiracetam extended release 500 mg 4/day. No seizures in many years. Also has his background chronic isolated MS with a lesion in his cervical cord area and fixed left leg weakness and gait difficulties. Do not see any change there today. The only new item is he had the discovery of prostate cancer and is being formulated with the urologist and will be better defined as continued testing advances. Hopefully it is a very noninvasive static type picture and involves minimal intervention and no hint of progression etc.  Exam looks baseline and I will see him back in 6 months. Always maintains a great attitude. Current Outpatient Medications   Medication Sig Dispense Refill    lisinopriL (PRINIVIL, ZESTRIL) 20 mg tablet       levETIRAcetam (KEPPRA XR) 500 mg ER tablet TAKE 4 TABLETS EVERY       MORNING 360 Tab 3    pravastatin (PRAVACHOL) 20 mg tablet Take 20 mg by mouth nightly.  lisinopril (PRINIVIL, ZESTRIL) 5 mg tablet Take  by mouth daily. No Known Allergies  Past Medical History:   Diagnosis Date    Autoimmune disease ([facility] [address]     MS    Hypertension     Ill-defined condition     incr.  cholesterol    MS (multiple sclerosis) (HCC)     Dx in 2013    Seizures ([facility] [address]     last was 2/14/2012 (none since back on seizure med)      Past Surgical History:   Procedure Laterality Date    HX OTHER SURGICAL      wisdom teeth    HX TONSILLECTOMY        Social History     Socioeconomic History    Marital status:      Spouse name: Not on file    Number of children: Not on file    Years of education: Not on file    Highest education level: Not on file   Occupational History    Not on file   Tobacco Use    Smoking status: Never Smoker    Smokeless tobacco: Never Used   Vaping Use    Vaping Use: Never used   Substance and Sexual Activity    Alcohol use: No    Drug use: No    Sexual activity: Not on file   Other Topics Concern    Not on file   Social History Narrative    Not on file     Social Determinants of Health     Financial Resource Strain:     Difficulty of Paying Living Expenses:    Food Insecurity:     Worried About Running Out of Food in the Last Year:     [address] in the Last Year:    Transportation Needs:     Lack of Transportation (Medical):  Lack of Transportation (Non-Medical):    Physical Activity:     Days of Exercise per Week:     Minutes of Exercise per Session:    Stress:     Feeling of Stress :    Social Connections:     Frequency of Communication with Friends and Family:     Frequency of Social Gatherings with Friends and Family:     Attends Zoroastrian Services:     Active Member of Clubs or Organizations:     Attends Club or Organization Meetings:     Marital Status:    Intimate Partner Violence:     Fear of Current or Ex-Partner:     Emotionally Abused:     Physically Abused:     Sexually Abused:       History reviewed. No pertinent family history. Visit Vitals  BP (!) 142/82 (BP 1 Location: Left upper arm, BP Patient Position: Sitting, BP Cuff Size: Adult)   Pulse 80   Ht 5' 10\" (1.778 m)   Wt 93.4 kg (206 lb)   SpO2 97%   BMI 29.56 kg/m²        Review of Systems:   A comprehensive review of systems was negative except for that written in the HPI. Neuro Exam:     Appearance: The patient is well developed, well nourished, provides a coherent history and is in no acute distress. Mental Status: Oriented to time, place and person. Mood and affect appropriate. Cranial Nerves:   Intact visual fields. Fundi are benign. BALDO, EOM's full, no nystagmus, no ptosis. Facial sensation is normal. Corneal reflexes are intact. Facial movement is symmetric. Hearing is normal bilaterally.  Palate is midline with normal sternocleidomastoid and trapezius muscles are normal. Tongue is midline. Motor:  5/5 strength in upper and lower proximal and distal muscles except in the left leg where he turns in a 4+ to 5 - performance as before. . Normal bulk and tone. No fasciculations. Reflexes:   Deep tendon reflexes 2+/4 and symmetrical.   Sensory:   Normal to touch, pinprick and vibration. Gait:   Patient demonstrates his usual left foot circumduction limping gait step to step. Tremor:   No tremor noted. Cerebellar:  No cerebellar signs present. Neurovascular:  Normal heart sounds and regular rhythm, peripheral pulses intact, and no carotid bruits. 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 We will continue with the levetiracetam extended release 500 mg 4/day. Has done absolutely fantastic and has not had any seizures in many years. Get good sleep minimize stress and stay compliant with medicine. Problem 2 chronic nonprogressive localized MS. Turns in the same exam basically is I have seen in recent visits. No changes. Plan:   Revisit 6 months.   Signed by :  Sanchez Parker MD

## 2021-08-06 NOTE — PATIENT INSTRUCTIONS
Patient history reviewed patient examined. Will recommend continuation of the levetiracetam extended release 500 mg taking 4/day. Is very much status quo with his left leg performance and good luck on the newly discovered prostate cancer and I hope it goes in a very smooth predictable fashion. Revisit 6 months.

## 2021-08-06 NOTE — LETTER
8/6/2021    Patient: Gautam Colunga   YOB: 1950   Date of Visit: 8/6/2021     Jere Dunlap, [address] [facility] [phone]  Via Fax: [phone]    Dear Jere Dunlap DO,      Thank you for referring Mr. Yesica Briggs to [facility] for evaluation. My notes for this consultation are attached. If you have questions, please do not hesitate to call me. I look forward to following your patient along with you.       Sincerely,    Leny Sierra MD

## 2022-02-07 ENCOUNTER — OFFICE VISIT (OUTPATIENT)
Dept: NEUROLOGY | Age: 72
End: 2022-02-07
Payer: MEDICARE

## 2022-02-07 VITALS
SYSTOLIC BLOOD PRESSURE: 129 MMHG | WEIGHT: 206 LBS | RESPIRATION RATE: 14 BRPM | HEART RATE: 70 BPM | BODY MASS INDEX: 29.49 KG/M2 | DIASTOLIC BLOOD PRESSURE: 74 MMHG | HEIGHT: 70 IN | OXYGEN SATURATION: 98 % | TEMPERATURE: 97.4 F

## 2022-02-07 DIAGNOSIS — G35 MS (MULTIPLE SCLEROSIS) (HCC): Primary | ICD-10-CM

## 2022-02-07 DIAGNOSIS — G40.009 LOCALIZATION-RELATED (FOCAL) (PARTIAL) IDIOPATHIC EPILEPSY AND EPILEPTIC SYNDROMES WITH SEIZURES OF LOCALIZED ONSET, NOT INTRACTABLE, WITHOUT STATUS EPILEPTICUS (HCC): ICD-10-CM

## 2022-02-07 PROCEDURE — G8419 CALC BMI OUT NRM PARAM NOF/U: HCPCS | Performed by: SPECIALIST

## 2022-02-07 PROCEDURE — 1101F PT FALLS ASSESS-DOCD LE1/YR: CPT | Performed by: SPECIALIST

## 2022-02-07 PROCEDURE — 3017F COLORECTAL CA SCREEN DOC REV: CPT | Performed by: SPECIALIST

## 2022-02-07 PROCEDURE — G8432 DEP SCR NOT DOC, RNG: HCPCS | Performed by: SPECIALIST

## 2022-02-07 PROCEDURE — 99213 OFFICE O/P EST LOW 20 MIN: CPT | Performed by: SPECIALIST

## 2022-02-07 PROCEDURE — G8536 NO DOC ELDER MAL SCRN: HCPCS | Performed by: SPECIALIST

## 2022-02-07 PROCEDURE — G8427 DOCREV CUR MEDS BY ELIG CLIN: HCPCS | Performed by: SPECIALIST

## 2022-02-07 RX ORDER — LEVETIRACETAM 500 MG/1
TABLET, EXTENDED RELEASE ORAL
Qty: 360 TABLET | Refills: 1 | Status: SHIPPED | OUTPATIENT
Start: 2022-02-07 | End: 2022-06-20

## 2022-02-07 NOTE — PROGRESS NOTES
Neurology Consult      Subjective:      Gosia Urbano is a 80 y.o. 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 Has been many years on his solid medicine the levetiracetam extended release 500 mg 4/day. No seizures in many years. No downside to taking the levetiracetam.  Also has chronically isolated MS to the upper cervical cord segment and does not appear to be either historically or neurologically progressive. Does notice as he would that with distractions or depending on how complicated his pathway gets sometimes can hit items in his pathway to the left side of his leg or foot that can make his balance somewhat precarious. Says he currently is being watched with prostate cancer with his urologist and PSA assays. His exam is remarkably baseline and will see him back in 6 months. Current Outpatient Medications   Medication Sig Dispense Refill    levETIRAcetam (KEPPRA XR) 500 mg ER tablet Take 4 tablets by mouth every morning. 360 Tablet 1    lisinopriL (PRINIVIL, ZESTRIL) 20 mg tablet       pravastatin (PRAVACHOL) 20 mg tablet Take 20 mg by mouth nightly. No Known Allergies  Past Medical History:   Diagnosis Date    Autoimmune disease ([facility] [address]     MS    Hypertension     Ill-defined condition     incr.  cholesterol    MS (multiple sclerosis) (HCC)     Dx in 2013    Seizures ([facility] [address]     last was 2/14/2012 (none since back on seizure med)      Past Surgical History:   Procedure Laterality Date    HX OTHER SURGICAL      wisdom teeth    HX TONSILLECTOMY        Social History     Socioeconomic History    Marital status:      Spouse name: Not on file    Number of children: Not on file    Years of education: Not on file    Highest education level: Not on file   Occupational History    Not on file   Tobacco Use    Smoking status: Never Smoker    Smokeless tobacco: Never Used   Vaping Use    Vaping Use: Never used   Substance and Sexual Activity    Alcohol use: No    Drug use: No    Sexual activity: Not on file   Other Topics Concern    Not on file   Social History Narrative    Not on file     Social Determinants of Health     Financial Resource Strain:     Difficulty of Paying Living Expenses: Not on file   Food Insecurity:     Worried About Running Out of Food in the Last Year: Not on file    Layla of Food in the Last Year: Not on file   Transportation Needs:     Lack of Transportation (Medical): Not on file    Lack of Transportation (Non-Medical): Not on file   Physical Activity:     Days of Exercise per Week: Not on file    Minutes of Exercise per Session: Not on file   Stress:     Feeling of Stress : Not on file   Social Connections:     Frequency of Communication with Friends and Family: Not on file    Frequency of Social Gatherings with Friends and Family: Not on file    Attends Zoroastrianism Services: Not on file    Active Member of [address] [place] [address] or Organizations: Not on file    Attends Club or Organization Meetings: Not on file    Marital Status: Not on file   Intimate Partner Violence:     Fear of Current or Ex-Partner: Not on file    Emotionally Abused: Not on file    Physically Abused: Not on file    Sexually Abused: Not on file   Housing Stability:     Unable to Pay for Housing in the Last Year: Not on file    Number of [place] in the Last Year: Not on file    Unstable Housing in the Last Year: Not on file      No family history on file. Visit Vitals  /74 (BP 1 Location: Left upper arm, BP Patient Position: Sitting)   Pulse 70   Temp 97.4 °F (36.3 °C) (Temporal)   Resp 14   Ht 5' 10\" (1.778 m)   Wt 93.4 kg (206 lb)   SpO2 98%   BMI 29.56 kg/m²        Review of Systems:   A comprehensive review of systems was negative except for that written in the HPI. Neuro Exam:     Appearance:   The patient is well developed, well nourished, provides a coherent history and is in no acute distress. Mental Status: Oriented to time, place and person. Mood and affect appropriate. Cranial Nerves:   Intact visual fields. Fundi are benign. BALDO, EOM's full, no nystagmus, no ptosis. Facial sensation is normal. Corneal reflexes are intact. Facial movement is symmetric. Hearing is normal bilaterally. Palate is midline with normal sternocleidomastoid and trapezius muscles are normal. Tongue is midline. Motor:  5/5 strength in upper and in the lower proximal and distal muscles for the left leg he has 4+ to 5 - capabilities with foot dorsi flexion and plantar flexion and inversion and eversion capabilities. . Normal bulk and tone. No fasciculations. Reflexes:   Deep tendon reflexes 2-3+/4 and symmetrical.   Sensory:   Normal to touch, pinprick and vibration. Gait:   Patient has slight circumduction limping on the left leg step to step as before. Tremor:   No tremor noted. Cerebellar:  No cerebellar signs present. Neurovascular:  Normal heart sounds and regular rhythm, peripheral pulses intact, and no carotid bruits. 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 Continue with levetiracetam extended release 500 mg taking 4/day. Chronically isolated MS of the cervical cord. Currently elected not to pursue formal treatment intervention and has not broadened into other debilitating CNS features. Plan:   Revisit 6 months.   Signed by :  Helene Osgood MD

## 2022-02-07 NOTE — PATIENT INSTRUCTIONS
Patient history viewed patient examined. Renewed the patient's levetiracetam extended release by request and he appears to be status quo in terms of exam including his isolated MS feature of the left leg. Suggestions go to following up in 6 months.

## 2022-08-08 ENCOUNTER — OFFICE VISIT (OUTPATIENT)
Dept: NEUROLOGY | Age: 72
End: 2022-08-08
Payer: MEDICARE

## 2022-08-08 VITALS
OXYGEN SATURATION: 98 % | SYSTOLIC BLOOD PRESSURE: 134 MMHG | RESPIRATION RATE: 20 BRPM | DIASTOLIC BLOOD PRESSURE: 82 MMHG | HEART RATE: 77 BPM

## 2022-08-08 DIAGNOSIS — G40.009 LOCALIZATION-RELATED (FOCAL) (PARTIAL) IDIOPATHIC EPILEPSY AND EPILEPTIC SYNDROMES WITH SEIZURES OF LOCALIZED ONSET, NOT INTRACTABLE, WITHOUT STATUS EPILEPTICUS (HCC): ICD-10-CM

## 2022-08-08 DIAGNOSIS — G35 MS (MULTIPLE SCLEROSIS) (HCC): Primary | ICD-10-CM

## 2022-08-08 PROCEDURE — 3017F COLORECTAL CA SCREEN DOC REV: CPT | Performed by: SPECIALIST

## 2022-08-08 PROCEDURE — G8432 DEP SCR NOT DOC, RNG: HCPCS | Performed by: SPECIALIST

## 2022-08-08 PROCEDURE — G8417 CALC BMI ABV UP PARAM F/U: HCPCS | Performed by: SPECIALIST

## 2022-08-08 PROCEDURE — 1101F PT FALLS ASSESS-DOCD LE1/YR: CPT | Performed by: SPECIALIST

## 2022-08-08 PROCEDURE — 99214 OFFICE O/P EST MOD 30 MIN: CPT | Performed by: SPECIALIST

## 2022-08-08 PROCEDURE — G8427 DOCREV CUR MEDS BY ELIG CLIN: HCPCS | Performed by: SPECIALIST

## 2022-08-08 PROCEDURE — 1123F ACP DISCUSS/DSCN MKR DOCD: CPT | Performed by: SPECIALIST

## 2022-08-08 PROCEDURE — G8536 NO DOC ELDER MAL SCRN: HCPCS | Performed by: SPECIALIST

## 2022-08-08 RX ORDER — LEVETIRACETAM 500 MG/1
TABLET, EXTENDED RELEASE ORAL
Qty: 360 TABLET | Refills: 1 | Status: SHIPPED | OUTPATIENT
Start: 2022-08-08

## 2022-08-08 NOTE — PATIENT INSTRUCTIONS
Patient history viewed patient examined. Recommend continuation of Keppra as usual and renewal will take place today. Duly noted circumduction limping left foot step to step and patient has managed to carefully avoid falls etc.  Will suggest revisit in 6 months and if the weather is disagreeable just reschedule.

## 2022-08-08 NOTE — LETTER
8/8/2022    Patient: Bruno Martini   YOB: 1950   Date of Visit: 8/8/2022     Kylee Garcia, [address] [facility] [address]  Via Fax: [phone]    Dear Kylee Garcia DO,      Thank you for referring Mr. Duana Fothergill to [facility] for evaluation. My notes for this consultation are attached. If you have questions, please do not hesitate to call me. I look forward to following your patient along with you.       Sincerely,    Elijah Caldwell MD

## 2022-08-08 NOTE — PROGRESS NOTES
No seizures since his last visit, will need a renewal   MS- can tell that he is declining, heat and humidity makes things worse  Does stumble a little more but no falls   Walking gets a little difficult practically every month

## 2022-08-08 NOTE — PROGRESS NOTES
Neurology Consult      Subjective:      Aniyah Swain is a 80 y.o. male who comes in today with background history of localization related epilepsy not intractable without status epilepticus and seizure-free since last visit. We will continue on his Keppra extended release for every morning. Medicine renewed by request.  No tolerability issues and hopefully his insurance will cut him a break as he approaches end of year renewal etc.    Clinical isolated syndrome version of MS. Did not commit to treatment and at this point is functional but certainly is on a learning curve with what he can and cannot do safely with regular gait performance in and out of cars steps and similar challenges. No falls and no component of pain etc.  As he is noted he has to accommodate his performance more and more as time goes on. I will see him in 6 months and if the weather is inclement he should definitely reschedule. Current Outpatient Medications   Medication Sig Dispense Refill    lisinopriL (PRINIVIL, ZESTRIL) 20 mg tablet       pravastatin (PRAVACHOL) 20 mg tablet Take 20 mg by mouth nightly. levETIRAcetam (KEPPRA XR) 500 mg ER tablet 4 p.o. every morning 360 Tablet 1      No Known Allergies  Past Medical History:   Diagnosis Date    Autoimmune disease ([facility] [address]     MS    Hypertension     Ill-defined condition     incr.  cholesterol    MS (multiple sclerosis) (HCC)     Dx in 2013    Seizures ([facility] [address]     last was 2/14/2012 (none since back on seizure med)      Past Surgical History:   Procedure Laterality Date    HX OTHER SURGICAL      wisdom teeth    HX OTHER SURGICAL  06/2022    prostate biopsy    HX TONSILLECTOMY        Social History     Socioeconomic History    Marital status:      Spouse name: Not on file    Number of children: Not on file    Years of education: Not on file    Highest education level: Not on file   Occupational History    Not on file   Tobacco Use    Smoking status: Never    Smokeless tobacco: Never   Vaping Use    Vaping Use: Never used   Substance and Sexual Activity    Alcohol use: No    Drug use: No    Sexual activity: Not on file   Other Topics Concern    Not on file   Social History Narrative    Not on file     Social Determinants of Health     Financial Resource Strain: Not on file   Food Insecurity: Not on file   Transportation Needs: Not on file   Physical Activity: Not on file   Stress: Not on file   Social Connections: Not on file   Intimate Partner Violence: Not on file   Housing Stability: Not on file      No family history on file. Visit Vitals  /82 (BP 1 Location: Left upper arm, BP Patient Position: Sitting, BP Cuff Size: Adult)   Pulse 77   Resp 20   SpO2 98%        Review of Systems:   A comprehensive review of systems was negative except for that written in the HPI. Neuro Exam:     Appearance: The patient is well developed, well nourished, provides a coherent history and is in no acute distress. Mental Status: Oriented to time, place and person. Mood and affect appropriate. Cranial Nerves:   Intact visual fields. Fundi are benign. BALDO, EOM's full, no nystagmus, no ptosis. Facial sensation is normal. Corneal reflexes are intact. Facial movement is symmetric. Hearing is normal bilaterally. Palate is midline with normal sternocleidomastoid and trapezius muscles are normal. Tongue is midline. Motor:  5/5 strength in upper and lower proximal and distal muscles. Normal bulk and tone. No fasciculations. Reflexes:   Deep tendon reflexes 2-3+/4 and symmetrical.   Sensory:   Normal to touch, pinprick and vibration. Gait:  Patient demonstrates his baseline circumduction limping on left foot approximating step to step performance. Tremor:   No tremor noted. Cerebellar:  No cerebellar signs present. Neurovascular:  Normal heart sounds and regular rhythm, peripheral pulses intact, and no carotid bruits.             Assessment:   Localization-related epilepsy with partial seizure expression not intractable and without status epilepticus etc.  Renew the Keppra extended release 500 mg 4 p.o. every morning. Seizure-free and doing well no reason to change anything. Clinically isolated syndrome version of MS. Seems to be may be a little worse than on previous visit but I emphasized the word little. Not committed to any therapy at this point and seems to be functional.      Plan:   Revisit 6 months.   Signed by :  Nicko Gaspar MD

## 2023-02-13 ENCOUNTER — OFFICE VISIT (OUTPATIENT)
Dept: NEUROLOGY | Age: 73
End: 2023-02-13
Payer: MEDICARE

## 2023-02-13 VITALS
OXYGEN SATURATION: 98 % | SYSTOLIC BLOOD PRESSURE: 130 MMHG | HEART RATE: 97 BPM | RESPIRATION RATE: 20 BRPM | DIASTOLIC BLOOD PRESSURE: 80 MMHG

## 2023-02-13 DIAGNOSIS — G35 MS (MULTIPLE SCLEROSIS) (HCC): Primary | ICD-10-CM

## 2023-02-13 DIAGNOSIS — G40.009 LOCALIZATION-RELATED (FOCAL) (PARTIAL) IDIOPATHIC EPILEPSY AND EPILEPTIC SYNDROMES WITH SEIZURES OF LOCALIZED ONSET, NOT INTRACTABLE, WITHOUT STATUS EPILEPTICUS (HCC): ICD-10-CM

## 2023-02-13 PROCEDURE — 3017F COLORECTAL CA SCREEN DOC REV: CPT | Performed by: SPECIALIST

## 2023-02-13 PROCEDURE — G8427 DOCREV CUR MEDS BY ELIG CLIN: HCPCS | Performed by: SPECIALIST

## 2023-02-13 PROCEDURE — 1101F PT FALLS ASSESS-DOCD LE1/YR: CPT | Performed by: SPECIALIST

## 2023-02-13 PROCEDURE — G8536 NO DOC ELDER MAL SCRN: HCPCS | Performed by: SPECIALIST

## 2023-02-13 PROCEDURE — 99213 OFFICE O/P EST LOW 20 MIN: CPT | Performed by: SPECIALIST

## 2023-02-13 PROCEDURE — G8421 BMI NOT CALCULATED: HCPCS | Performed by: SPECIALIST

## 2023-02-13 PROCEDURE — 1123F ACP DISCUSS/DSCN MKR DOCD: CPT | Performed by: SPECIALIST

## 2023-02-13 PROCEDURE — G8432 DEP SCR NOT DOC, RNG: HCPCS | Performed by: SPECIALIST

## 2023-02-13 RX ORDER — TADALAFIL 5 MG/1
5 TABLET ORAL DAILY
COMMUNITY

## 2023-02-13 RX ORDER — LEVETIRACETAM 500 MG/1
TABLET, EXTENDED RELEASE ORAL
Qty: 360 TABLET | Refills: 1 | Status: SHIPPED | OUTPATIENT
Start: 2023-02-13

## 2023-02-13 NOTE — PATIENT INSTRUCTIONS
Patient history reviewed patient examined. We will go ahead and renew the Keppra extended release with this visit and suggest revisit in 6 months. Has been a pleasure working with this gentleman and I think one of his strengths is he knows his limitations and works within them. Good luck on all fronts and it has been a good 15 years of collaboration.

## 2023-02-13 NOTE — PROGRESS NOTES
Neurology Consult      Subjective:      Beau Boo is a 71 y.o. 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 Has done well on his levetiracetam extended release 500 mg 4 each morning. 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 He uniquely knows his limitations and avoids falls and has changed his stride length and accommodation to steppage on the gait. He knows if he operates under any other condition of being rushed and lengthening his stride length and taking proverbial shortcuts that he could invite a fall and injury. Current Outpatient Medications   Medication Sig Dispense Refill    tadalafiL (CIALIS) 5 mg tablet Take 5 mg by mouth daily. levETIRAcetam (KEPPRA XR) 500 mg ER tablet 4 p.o. every morning 360 Tablet 1    lisinopriL (PRINIVIL, ZESTRIL) 20 mg tablet       pravastatin (PRAVACHOL) 20 mg tablet Take 20 mg by mouth nightly. No Known Allergies  Past Medical History:   Diagnosis Date    Autoimmune disease ([facility] [address]     MS    Hypertension     Ill-defined condition     incr.  cholesterol    MS (multiple sclerosis) (HCC)     Dx in 2013    Seizures ([facility] [address]     last was 2/14/2012 (none since back on seizure med)      Past Surgical History:   Procedure Laterality Date    HX OTHER SURGICAL      wisdom teeth    HX OTHER SURGICAL  06/2022    prostate biopsy    HX TONSILLECTOMY        Social History     Socioeconomic History    Marital status:      Spouse name: Not on file    Number of children: Not on file    Years of education: Not on file    Highest education level: Not on file   Occupational History    Not on file   Tobacco Use    Smoking status: Never    Smokeless tobacco: Never   Vaping Use    Vaping Use: Never used   Substance and Sexual Activity Alcohol use: No    Drug use: No    Sexual activity: Not on file   Other Topics Concern    Not on file   Social History Narrative    Not on file     Social Determinants of Health     Financial Resource Strain: Not on file   Food Insecurity: Not on file   Transportation Needs: Not on file   Physical Activity: Not on file   Stress: Not on file   Social Connections: Not on file   Intimate Partner Violence: Not on file   Housing Stability: Not on file      No family history on file. Visit Vitals  /80 (BP 1 Location: Left upper arm, BP Patient Position: Sitting, BP Cuff Size: Adult)   Pulse 97   Resp 20   SpO2 98%        Review of Systems:   A comprehensive review of systems was negative except for that written in the HPI. Neuro Exam:     Appearance: The patient is well developed, well nourished, provides a coherent history and is in no acute distress. Mental Status: Oriented to time, place and person. Mood and affect appropriate. Cranial Nerves:   Intact visual fields. Fundi are benign. BALDO, EOM's full, no nystagmus, no ptosis. Facial sensation is normal. Corneal reflexes are intact. Facial movement is symmetric. Hearing is normal bilaterally. Palate is midline with normal sternocleidomastoid and trapezius muscles are normal. Tongue is midline. Motor:  5/5 strength in upper and in the lower proximal and distal muscles of the left leg he demonstrates 4+ to 5 - strength left lower extremity to dorsiflexion plantarflexion inversion eversion of the foot etc. Normal bulk and tone. No fasciculations. Reflexes:   Deep tendon reflexes 2-3+/4 and symmetrical.   Sensory:   Normal to touch, pinprick and vibration. Gait:  As before he has some circumduction and limping on the left foot step to step. Tremor:   No tremor noted. Cerebellar:  No cerebellar signs present. Neurovascular:  Normal heart sounds and regular rhythm, peripheral pulses intact, and no carotid bruits.             Assessment: Localization-related epilepsy not intractable not status epilepticus etc. is very nicely controlled through the years and will not change his medicine as levetiracetam extended release 500 mg 4/day. Get good sleep minimize stress and stay compliant with medicine. Chronic isolated MS. Never committed to a DMT but is doing well within his realm of isolated left leg weakness and rigidity. Knows his limitations and avoids falls etc.    Plan:   Revisit 6 months.   Signed by :  Thane Bence MD

## 2023-02-13 NOTE — PROGRESS NOTES
MS- no major changes, walking is a bit more of a challenge than 6 months ago, fatigue has increased   Balance issues are the same   Still doesn't change positions slowly       Epilepsy-  no seizures since his last visit

## 2023-02-13 NOTE — LETTER
2/13/2023    Patient: Maryanna Mohs   YOB: 1950   Date of Visit: 2/13/2023     Shashank Gonzalez, [place] [phone]  Via Fax: [phone]    Dear Shashank Gonzalez DO,      Thank you for referring Mr. Jones Lyon to [facility] for evaluation. My notes for this consultation are attached. If you have questions, please do not hesitate to call me. I look forward to following your patient along with you.       Sincerely,    Iram Aguilera MD

## 2023-05-23 RX ORDER — LISINOPRIL 20 MG/1
TABLET ORAL
COMMUNITY
Start: 2021-01-28

## 2023-05-23 RX ORDER — TADALAFIL 5 MG/1
5 TABLET ORAL DAILY
COMMUNITY

## 2023-05-23 RX ORDER — LEVETIRACETAM 500 MG/1
TABLET, EXTENDED RELEASE ORAL
COMMUNITY
Start: 2023-02-13 | End: 2023-06-29 | Stop reason: SDUPTHER

## 2023-05-23 RX ORDER — PRAVASTATIN SODIUM 20 MG
20 TABLET ORAL NIGHTLY
COMMUNITY

## 2023-06-29 RX ORDER — LEVETIRACETAM 500 MG/1
TABLET, EXTENDED RELEASE ORAL
Qty: 120 TABLET | Refills: 5 | Status: SHIPPED | OUTPATIENT
Start: 2023-06-29

## 2023-10-10 ENCOUNTER — OFFICE VISIT (OUTPATIENT)
Age: 73
End: 2023-10-10
Payer: MEDICARE

## 2023-10-10 VITALS — SYSTOLIC BLOOD PRESSURE: 122 MMHG | OXYGEN SATURATION: 95 % | DIASTOLIC BLOOD PRESSURE: 84 MMHG | HEART RATE: 72 BPM

## 2023-10-10 DIAGNOSIS — G35 MULTIPLE SCLEROSIS (HCC): Primary | ICD-10-CM

## 2023-10-10 DIAGNOSIS — G40.009 LOCALIZATION-RELATED (FOCAL) (PARTIAL) IDIOPATHIC EPILEPSY AND EPILEPTIC SYNDROMES WITH SEIZURES OF LOCALIZED ONSET, NOT INTRACTABLE, WITHOUT STATUS EPILEPTICUS (HCC): ICD-10-CM

## 2023-10-10 PROCEDURE — G8427 DOCREV CUR MEDS BY ELIG CLIN: HCPCS | Performed by: NURSE PRACTITIONER

## 2023-10-10 PROCEDURE — G8421 BMI NOT CALCULATED: HCPCS | Performed by: NURSE PRACTITIONER

## 2023-10-10 PROCEDURE — 3017F COLORECTAL CA SCREEN DOC REV: CPT | Performed by: NURSE PRACTITIONER

## 2023-10-10 PROCEDURE — 4004F PT TOBACCO SCREEN RCVD TLK: CPT | Performed by: NURSE PRACTITIONER

## 2023-10-10 PROCEDURE — 99215 OFFICE O/P EST HI 40 MIN: CPT | Performed by: NURSE PRACTITIONER

## 2023-10-10 PROCEDURE — G8484 FLU IMMUNIZE NO ADMIN: HCPCS | Performed by: NURSE PRACTITIONER

## 2023-10-10 PROCEDURE — 1123F ACP DISCUSS/DSCN MKR DOCD: CPT | Performed by: NURSE PRACTITIONER

## 2023-10-10 RX ORDER — LEVETIRACETAM 500 MG/1
TABLET, EXTENDED RELEASE ORAL
Qty: 360 TABLET | Refills: 1 | Status: SHIPPED | OUTPATIENT
Start: 2023-10-10

## 2023-10-11 NOTE — PROGRESS NOTES
No seizures, no issues there, last know seizure 2012  MS said to be progressing, balance has been off some, still walks unassisted  Heat and fatigue seems to be affecting more, never been on DMT
\"BUN\", \"CREATININE\", \"GLUCOSE\", \"CALCIUM\", \"PROT\", \"LABALBU\", \"BILITOT\", \"ALKPHOS\", \"AST\", \"ALT\", \"LABGLOM\", \"GFRAA\", \"AGRATIO\", \"GLOB\"    No results found for: \"CHOL\"  No results found for: \"TRIG\"  No results found for: \"HDL\"  No results found for: \"LDLCHOLESTEROL\", \"LDLCALC\"  No results found for: \"LABVLDL\", \"VLDL\"  No results found for: \"CHOLHDLRATIO\"    No results found for: \"SEDRATE\"    No results found for: \"LABA1C\"  No results found for: \"MARJORIE\"             1. Multiple sclerosis ([address])  2.  Localization-related (focal) (partial) idiopathic epilepsy and epileptic syndromes with seizures of localized onset, not intractable, without status epilepticus ([address])     MS is doing well  Continue DMT therapy and we will watch and wait  He knows what triggers his symptoms to become worse    Continue Keppra for AED  2000 mg extended release daily  Follow-up in 1 year          This note will not be viewable in MyChart

## 2024-06-04 DIAGNOSIS — G40.009 LOCALIZATION-RELATED (FOCAL) (PARTIAL) IDIOPATHIC EPILEPSY AND EPILEPTIC SYNDROMES WITH SEIZURES OF LOCALIZED ONSET, NOT INTRACTABLE, WITHOUT STATUS EPILEPTICUS (HCC): Primary | ICD-10-CM

## 2024-06-04 RX ORDER — LEVETIRACETAM 500 MG/1
TABLET, FILM COATED, EXTENDED RELEASE ORAL
Qty: 360 TABLET | Refills: 3 | Status: SHIPPED | OUTPATIENT
Start: 2024-06-04

## 2024-10-11 ENCOUNTER — OFFICE VISIT (OUTPATIENT)
Age: 74
End: 2024-10-11
Payer: MEDICARE

## 2024-10-11 VITALS
RESPIRATION RATE: 17 BRPM | WEIGHT: 203 LBS | SYSTOLIC BLOOD PRESSURE: 149 MMHG | BODY MASS INDEX: 29.13 KG/M2 | OXYGEN SATURATION: 95 % | DIASTOLIC BLOOD PRESSURE: 70 MMHG | HEART RATE: 73 BPM

## 2024-10-11 DIAGNOSIS — G35 MULTIPLE SCLEROSIS (HCC): ICD-10-CM

## 2024-10-11 DIAGNOSIS — G40.009 LOCALIZATION-RELATED (FOCAL) (PARTIAL) IDIOPATHIC EPILEPSY AND EPILEPTIC SYNDROMES WITH SEIZURES OF LOCALIZED ONSET, NOT INTRACTABLE, WITHOUT STATUS EPILEPTICUS (HCC): Primary | ICD-10-CM

## 2024-10-11 PROCEDURE — 3017F COLORECTAL CA SCREEN DOC REV: CPT | Performed by: NURSE PRACTITIONER

## 2024-10-11 PROCEDURE — 99215 OFFICE O/P EST HI 40 MIN: CPT | Performed by: NURSE PRACTITIONER

## 2024-10-11 PROCEDURE — 1123F ACP DISCUSS/DSCN MKR DOCD: CPT | Performed by: NURSE PRACTITIONER

## 2024-10-11 PROCEDURE — G8427 DOCREV CUR MEDS BY ELIG CLIN: HCPCS | Performed by: NURSE PRACTITIONER

## 2024-10-11 PROCEDURE — G8484 FLU IMMUNIZE NO ADMIN: HCPCS | Performed by: NURSE PRACTITIONER

## 2024-10-11 PROCEDURE — 4004F PT TOBACCO SCREEN RCVD TLK: CPT | Performed by: NURSE PRACTITIONER

## 2024-10-11 PROCEDURE — G8419 CALC BMI OUT NRM PARAM NOF/U: HCPCS | Performed by: NURSE PRACTITIONER

## 2024-10-11 NOTE — PROGRESS NOTES
Max Payan is a 74 y.o. male who presents with the following  Chief Complaint   Patient presents with    Follow-up     Patient is here following up for seizures and MS. Patient reports no seizures. MS is giving him trouble.        HPI      FU MS, seizure disorder.       He is currently doing really well  He is not on any kind of therapy for MS      He notices that when he does overwork himself or the heat acts up his MS can cause him to become more fatigued and tired  He has no relapse type symptoms    Overall he feels like his balance continues to slowly decline but nothing really big he is worried about  He does not take any kind of therapy  His scans are up-to-date for what they are  He does blood work once a year  He has no concerns overall and does not feel like he needs any physical therapy as he does stay very physically active at home     His seizure disorder is stable  He is currently on Keppra 2000 mg extended release  No jerking or twitching or seizure activity  He is sleeping well  He is eating well  No memory loss or confusion       No Known Allergies    Current Outpatient Medications   Medication Sig Dispense Refill    levETIRAcetam (KEPPRA XR) 500 MG TB24 extended release tablet TAKE 4 TABLETS EVERY MORNING 360 tablet 3    lisinopril (PRINIVIL;ZESTRIL) 20 MG tablet ceived the following from [facility]: Outside name: lisinopriL (PRINIVIL, ZESTRIL) 20 mg tablet      pravastatin (PRAVACHOL) 20 MG tablet Take 1 tablet by mouth nightly      tadalafil (CIALIS) 5 MG tablet Take 1 tablet by mouth daily       No current facility-administered medications for this visit.        Social History     Tobacco Use   Smoking Status Never   Smokeless Tobacco Never       Past Medical History:   Diagnosis Date    Autoimmune disease (HCC)     MS    Hypertension     Ill-defined condition     incr. cholesterol    MS (multiple sclerosis) (HCC)     Dx in 2013    Seizures (HCC)     last was

## 2025-08-15 DIAGNOSIS — G40.009 LOCALIZATION-RELATED (FOCAL) (PARTIAL) IDIOPATHIC EPILEPSY AND EPILEPTIC SYNDROMES WITH SEIZURES OF LOCALIZED ONSET, NOT INTRACTABLE, WITHOUT STATUS EPILEPTICUS (HCC): ICD-10-CM

## 2025-08-17 RX ORDER — LEVETIRACETAM 500 MG/1
TABLET, FILM COATED, EXTENDED RELEASE ORAL
Qty: 360 TABLET | Refills: 5 | Status: SHIPPED | OUTPATIENT
Start: 2025-08-17